# Patient Record
Sex: FEMALE | Race: WHITE
[De-identification: names, ages, dates, MRNs, and addresses within clinical notes are randomized per-mention and may not be internally consistent; named-entity substitution may affect disease eponyms.]

---

## 2017-11-08 ENCOUNTER — HOSPITAL ENCOUNTER (INPATIENT)
Dept: HOSPITAL 26 - MED | Age: 37
LOS: 2 days | Discharge: HOME | DRG: 445 | End: 2017-11-10
Payer: SELF-PAY

## 2017-11-08 VITALS — DIASTOLIC BLOOD PRESSURE: 74 MMHG | SYSTOLIC BLOOD PRESSURE: 140 MMHG

## 2017-11-08 VITALS — DIASTOLIC BLOOD PRESSURE: 78 MMHG | SYSTOLIC BLOOD PRESSURE: 115 MMHG

## 2017-11-08 VITALS — SYSTOLIC BLOOD PRESSURE: 121 MMHG | DIASTOLIC BLOOD PRESSURE: 85 MMHG

## 2017-11-08 VITALS — DIASTOLIC BLOOD PRESSURE: 75 MMHG | SYSTOLIC BLOOD PRESSURE: 117 MMHG

## 2017-11-08 VITALS — WEIGHT: 180 LBS | BODY MASS INDEX: 28.93 KG/M2 | HEIGHT: 66 IN

## 2017-11-08 VITALS — DIASTOLIC BLOOD PRESSURE: 77 MMHG | SYSTOLIC BLOOD PRESSURE: 128 MMHG

## 2017-11-08 VITALS — SYSTOLIC BLOOD PRESSURE: 118 MMHG | DIASTOLIC BLOOD PRESSURE: 58 MMHG

## 2017-11-08 DIAGNOSIS — E66.3: ICD-10-CM

## 2017-11-08 DIAGNOSIS — E87.6: ICD-10-CM

## 2017-11-08 DIAGNOSIS — K29.70: ICD-10-CM

## 2017-11-08 DIAGNOSIS — K75.81: ICD-10-CM

## 2017-11-08 DIAGNOSIS — E44.1: ICD-10-CM

## 2017-11-08 DIAGNOSIS — E83.51: ICD-10-CM

## 2017-11-08 DIAGNOSIS — E02: ICD-10-CM

## 2017-11-08 DIAGNOSIS — K80.70: Primary | ICD-10-CM

## 2017-11-08 DIAGNOSIS — R31.9: ICD-10-CM

## 2017-11-08 LAB
ALBUMIN FLD-MCNC: 3.3 G/DL (ref 3.4–5)
AMYLASE SERPL-CCNC: 56 U/L (ref 25–115)
ANION GAP SERPL CALCULATED.3IONS-SCNC: 8.6 MMOL/L (ref 8–16)
APPEARANCE UR: CLEAR
AST SERPL-CCNC: 70 U/L (ref 15–37)
BARBITURATES UR QL SCN: (no result) NG/ML
BENZODIAZ UR QL SCN: (no result) NG/ML
BILIRUB SERPL-MCNC: 0.2 MG/DL (ref 0–1)
BILIRUB UR QL STRIP: NEGATIVE
BUN SERPL-MCNC: 13 MG/DL (ref 7–18)
BZE UR QL SCN: (no result) NG/ML
CANNABINOIDS UR QL SCN: (no result) NG/ML
CHLORIDE SERPL-SCNC: 104 MMOL/L (ref 98–107)
CHOLEST/HDLC SERPL: 2.5 {RATIO} (ref 1–4.5)
CO2 SERPL-SCNC: 30.8 MMOL/L (ref 21–32)
COLOR UR: YELLOW
CREAT SERPL-MCNC: 0.8 MG/DL (ref 0.6–1.3)
EOSINOPHIL NFR BLD MANUAL: 1 % (ref 0–4)
ERYTHROCYTE [DISTWIDTH] IN BLOOD BY AUTOMATED COUNT: 11.9 % (ref 11.6–13.7)
GFR SERPL CREATININE-BSD FRML MDRD: 104 ML/MIN (ref 90–?)
GLUCOSE SERPL-MCNC: 100 MG/DL (ref 74–106)
GLUCOSE UR STRIP-MCNC: NEGATIVE MG/DL
HCT VFR BLD AUTO: 41.7 % (ref 36–48)
HDLC SERPL-MCNC: 65 MG/DL (ref 40–60)
HGB BLD-MCNC: 13.4 G/DL (ref 12–16)
HGB UR QL STRIP: (no result)
LDLC SERPL CALC-MCNC: 89 MG/DL (ref 60–100)
LEUKOCYTE ESTERASE UR QL STRIP: NEGATIVE
LIPASE SERPL-CCNC: 146 U/L (ref 73–393)
LYMPHOCYTES NFR BLD MANUAL: 32 % (ref 20–46)
MAGNESIUM SERPL-MCNC: 2.1 MG/DL (ref 1.8–2.4)
MCH RBC QN AUTO: 28 PG (ref 27–31)
MCHC RBC AUTO-ENTMCNC: 32 G/DL (ref 33–37)
MCV RBC AUTO: 86 FL (ref 80–94)
MONOCYTES NFR BLD MANUAL: 12 % (ref 5–12)
NITRITE UR QL STRIP: NEGATIVE
OPIATES UR QL SCN: (no result) NG/ML
PCP UR QL SCN: (no result) NG/ML
PH UR STRIP: 6 [PH] (ref 5–9)
PHOSPHATE SERPL-MCNC: 4.1 MG/DL (ref 2.5–4.9)
PLATELET # BLD AUTO: 322 K/UL (ref 140–450)
POTASSIUM SERPL-SCNC: 3.4 MMOL/L (ref 3.5–5.1)
PROTHROMBIN TIME: 10.8 SECS (ref 10.8–13.4)
RBC # BLD AUTO: 4.85 MIL/UL (ref 4.2–5.4)
RBC #/AREA URNS HPF: (no result) /HPF (ref 0–5)
SODIUM SERPL-SCNC: 140 MMOL/L (ref 136–145)
T4 FREE SERPL-MCNC: 1.11 NG/DL (ref 0.76–1.46)
TRIGL SERPL-MCNC: 44 MG/DL (ref 30–150)
TSH SERPL DL<=0.05 MIU/L-ACNC: 3.86 UIU/ML (ref 0.34–3.74)
WBC # BLD AUTO: 7.5 K/UL (ref 4.8–10.8)
WBC,URINE: (no result) /HPF (ref 0–5)

## 2017-11-08 PROCEDURE — A9510 TC99M DISOFENIN: HCPCS

## 2017-11-08 PROCEDURE — C9113 INJ PANTOPRAZOLE SODIUM, VIA: HCPCS

## 2017-11-08 RX ADMIN — HYDROMORPHONE HYDROCHLORIDE PRN MG: 1 INJECTION, SOLUTION INTRAMUSCULAR; INTRAVENOUS; SUBCUTANEOUS at 11:33

## 2017-11-08 RX ADMIN — SODIUM CHLORIDE SCH MLS/HR: 9 INJECTION, SOLUTION INTRAVENOUS at 20:45

## 2017-11-08 RX ADMIN — SODIUM CHLORIDE SCH MLS/HR: 9 INJECTION, SOLUTION INTRAVENOUS at 05:21

## 2017-11-08 RX ADMIN — HYDROMORPHONE HYDROCHLORIDE PRN MG: 1 INJECTION, SOLUTION INTRAMUSCULAR; INTRAVENOUS; SUBCUTANEOUS at 17:27

## 2017-11-08 RX ADMIN — DOCUSATE SODIUM SCH MG: 100 CAPSULE, LIQUID FILLED ORAL at 20:47

## 2017-11-08 RX ADMIN — DOCUSATE SODIUM SCH MG: 100 CAPSULE, LIQUID FILLED ORAL at 09:00

## 2017-11-08 RX ADMIN — SODIUM CHLORIDE SCH MLS/HR: 9 INJECTION, SOLUTION INTRAVENOUS at 22:01

## 2017-11-08 NOTE — NUR
PATIENT ADMITTED TO THE UNIT FROM THE ER. PATIENT IS AWAKE, ALERT, AND ORIENTED. AMBULATORY. 
NO SIGNS AND SYMPTOMS OF DISTRESS NOTED. NO COMPLAINTS OF PAIN AT THIS TIME. SKIN IS INTACT. 
IV SITE IS NOTED ON LEFT AC, SALINE LOCKED. BED IN LOWEST POSITION, SIDE RAILS UP AND CALL 
LIGHT WITHIN REACH, WILL CONTINUE TO MONITOR.

## 2017-11-08 NOTE — NUR
DR BEAUCHAMP WAS NOTIFY HIDE SCAN RESULT  AND HE SAID  FOR TOMORROW  REGULAR DIET AND  SHE 
CAN GO HOME AND HE CAN SEE PT ON HIS OFFICE AFTER DISCHARGE

## 2017-11-08 NOTE — NUR
RECEIVED PT REPORT FROM NIGHT SHIFT NURSE AT BEDSIDE. PT IS AOX4. PT SHOWED NO S/S OF ACUTE 
DISTRESS. NO C/O PAIN AT THIS TIME. SKIN INTACT. IV NOTED ON THE LEFT ARM, PATENT, AND 
INFUSING WELL. CALL LIGHT WITHIN REACH.  BED LOWERED AND CALL LIGHT WITHIN REACH. WILL 
CONTINUE TO MONITOR.

## 2017-11-08 NOTE — NUR
RECEIVED PT REPORT FROM NIGHT SHIFT NURSE AT BEDSIDE. PT IS AOX4. PT SHOWED NO S/S OF ACUTE 
DISTRESS. NO C/O PAIN AT THIS TIME. SKIN INTACT. IV NOTED ON THE LEFT ARM, PATENT, AND 
INFUSING WELL. CALL LIGHT WITHIN REACH.  BED LOWERED AND CALL LIGHT WITHIN REACH. WILL 
CONTINUE TO MONITOR.

-------------------------------------------------------------------------------

Addendum: 11/08/17 at 1255 by Jacek Ramos RN

-------------------------------------------------------------------------------

PLEASE DISCARD THIS NOTE, WRONG TIME.

## 2017-11-08 NOTE — NUR
STAN CALLED FROM [facility] STATED THAT NO DELAYED IMAGE NEEDED. RESULT HASN'T BEEN READ 
BY THE RADIOLOGIST YET. INFORMED DR. DIAZ. DR ORDERED CLEAR LIQUID DIET, THEN NPO AFTER 
MIDNIGHT.

## 2017-11-08 NOTE — NUR
PATIENT HAS BEEN SCREENED AND CATEGORIZED AS MODERATE NUTRITION RISK. PATIENT WILL BE SEEN 
WITHIN 3-5 DAYS OF ADMISSION.



11/10/17-11/12/17



SHELBIE SEBASTIAN RD

## 2017-11-08 NOTE — NUR
RECEIVED PT FROM DAY SHIFT NURSE PT IS AAOX4 AMBULATORY IV ON LEFT AC INFUSING WELL ON 
TELEMETRY SR , RELATIVES AT BED SIDE INITIAL ASSESSMENT DONE

## 2017-11-08 NOTE — NUR
DR. BEAUCHAMP SEEN THE PT.  STATED HE WILL WAIT FOR THE HIDA SCAN RESULT, THEN DECIDE 
ABOUT THE SURGERY.

## 2017-11-08 NOTE — NUR
Patient will be admitted to care of DR CROUCH. Admited to TELE.  Will go to 
room 112B. Belongings list completed.  Report to LEXIE KING.

## 2017-11-08 NOTE — NUR
PATIENT PRESENTS TO ED WITH C/O EPIGASTRIC PAIN AND NAUSEA. HX OF GALLSTONES. 

PT SKIN IS PINK/WARM/DRY; AAOX4 WITH EVEN AND STEADY GAIT; LUNGS CLEAR BL; HR 
EVEN AND REGULAR; PT DENIES ANY FEVER, CP, SOB, OR COUGH AT THIS TIME; PATIENT 
STATES PAIN OF 10/10 AT THIS TIME; VSS; PATIENT POSITIONED FOR COMFORT; HOB 
ELEVATED; BEDRAILS UP X2; BED DOWN. ER MD MADE AWARE OF PT STATUS.

## 2017-11-08 NOTE — NUR
PT BACK TO THE UNIT. VITAL SIGNS TAKEN. PT IN STABLE CONDITION. RADIOLOGIST WANTS TO KEEP 
HER NPO FOR ANOTHER HOUR IN CASE DELAYED IMAGE NEEDED.

## 2017-11-09 VITALS — SYSTOLIC BLOOD PRESSURE: 135 MMHG | DIASTOLIC BLOOD PRESSURE: 77 MMHG

## 2017-11-09 VITALS — SYSTOLIC BLOOD PRESSURE: 102 MMHG | DIASTOLIC BLOOD PRESSURE: 61 MMHG

## 2017-11-09 VITALS — SYSTOLIC BLOOD PRESSURE: 133 MMHG | DIASTOLIC BLOOD PRESSURE: 78 MMHG

## 2017-11-09 VITALS — DIASTOLIC BLOOD PRESSURE: 81 MMHG | SYSTOLIC BLOOD PRESSURE: 121 MMHG

## 2017-11-09 VITALS — DIASTOLIC BLOOD PRESSURE: 75 MMHG | SYSTOLIC BLOOD PRESSURE: 116 MMHG

## 2017-11-09 LAB
ANION GAP SERPL CALCULATED.3IONS-SCNC: 8.4 MMOL/L (ref 8–16)
BASOPHILS # BLD AUTO: 0.3 K/UL (ref 0–0.22)
BASOPHILS NFR BLD AUTO: 4.7 % (ref 0–2)
BUN SERPL-MCNC: 9 MG/DL (ref 7–18)
CHLORIDE SERPL-SCNC: 106 MMOL/L (ref 98–107)
CO2 SERPL-SCNC: 28.6 MMOL/L (ref 21–32)
CREAT SERPL-MCNC: 0.8 MG/DL (ref 0.6–1.3)
EOSINOPHIL # BLD AUTO: 0.2 K/UL (ref 0–0.4)
EOSINOPHIL NFR BLD AUTO: 3.6 % (ref 0–4)
ERYTHROCYTE [DISTWIDTH] IN BLOOD BY AUTOMATED COUNT: 12.2 % (ref 11.6–13.7)
GFR SERPL CREATININE-BSD FRML MDRD: 104 ML/MIN (ref 90–?)
GLUCOSE SERPL-MCNC: 86 MG/DL (ref 74–106)
HCT VFR BLD AUTO: 38.7 % (ref 36–48)
HGB BLD-MCNC: 13 G/DL (ref 12–16)
LYMPHOCYTES # BLD AUTO: 2.4 K/UL (ref 2.5–16.5)
LYMPHOCYTES NFR BLD AUTO: 39.8 % (ref 20.5–51.1)
MAGNESIUM SERPL-MCNC: 2.1 MG/DL (ref 1.8–2.4)
MCH RBC QN AUTO: 29 PG (ref 27–31)
MCHC RBC AUTO-ENTMCNC: 34 G/DL (ref 33–37)
MCV RBC AUTO: 86 FL (ref 80–94)
MONOCYTES # BLD AUTO: 0.7 K/UL (ref 0.8–1)
MONOCYTES NFR BLD AUTO: 12.4 % (ref 1.7–9.3)
NEUTROPHILS # BLD AUTO: 2.3 K/UL (ref 1.8–7.7)
NEUTROPHILS NFR BLD AUTO: 39.5 % (ref 42.2–75.2)
PHOSPHATE SERPL-MCNC: 3.2 MG/DL (ref 2.5–4.9)
PLATELET # BLD AUTO: 307 K/UL (ref 140–450)
POTASSIUM SERPL-SCNC: 4 MMOL/L (ref 3.5–5.1)
RBC # BLD AUTO: 4.52 MIL/UL (ref 4.2–5.4)
SODIUM SERPL-SCNC: 139 MMOL/L (ref 136–145)
WBC # BLD AUTO: 5.9 K/UL (ref 4.8–10.8)

## 2017-11-09 RX ADMIN — Medication SCH EACH: at 08:22

## 2017-11-09 RX ADMIN — HYDROMORPHONE HYDROCHLORIDE PRN MG: 1 INJECTION, SOLUTION INTRAMUSCULAR; INTRAVENOUS; SUBCUTANEOUS at 00:39

## 2017-11-09 RX ADMIN — SODIUM CHLORIDE PRN MG: 9 INJECTION, SOLUTION INTRAVENOUS at 14:26

## 2017-11-09 RX ADMIN — PANTOPRAZOLE SODIUM SCH MG: 40 TABLET, DELAYED RELEASE ORAL at 21:55

## 2017-11-09 RX ADMIN — HYDROMORPHONE HYDROCHLORIDE PRN MG: 1 INJECTION, SOLUTION INTRAMUSCULAR; INTRAVENOUS; SUBCUTANEOUS at 06:22

## 2017-11-09 RX ADMIN — SODIUM CHLORIDE SCH MLS/HR: 9 INJECTION, SOLUTION INTRAVENOUS at 21:53

## 2017-11-09 RX ADMIN — HYDROMORPHONE HYDROCHLORIDE PRN MG: 1 INJECTION, SOLUTION INTRAMUSCULAR; INTRAVENOUS; SUBCUTANEOUS at 14:37

## 2017-11-09 RX ADMIN — DOCUSATE SODIUM SCH MG: 100 CAPSULE, LIQUID FILLED ORAL at 21:55

## 2017-11-09 RX ADMIN — HYDROMORPHONE HYDROCHLORIDE PRN MG: 1 INJECTION, SOLUTION INTRAMUSCULAR; INTRAVENOUS; SUBCUTANEOUS at 21:58

## 2017-11-09 RX ADMIN — SODIUM CHLORIDE SCH MLS/HR: 9 INJECTION, SOLUTION INTRAVENOUS at 05:11

## 2017-11-09 RX ADMIN — SODIUM CHLORIDE SCH MLS/HR: 9 INJECTION, SOLUTION INTRAVENOUS at 12:23

## 2017-11-09 RX ADMIN — DOCUSATE SODIUM SCH MG: 100 CAPSULE, LIQUID FILLED ORAL at 08:22

## 2017-11-09 RX ADMIN — SODIUM CHLORIDE PRN MG: 9 INJECTION, SOLUTION INTRAVENOUS at 08:22

## 2017-11-09 RX ADMIN — SODIUM CHLORIDE SCH MLS/HR: 9 INJECTION, SOLUTION INTRAVENOUS at 07:26

## 2017-11-09 NOTE — NUR
PATIENT VOMITED 200 ML. NO SIGNS AND SYMPTOMS OF DISTRESS NOTED AT THIS TIME. REQUESTED TO 
TAKE A SHOWER. MOTHER WILL ASSIST HER IN THE SHOWER.

## 2017-11-09 NOTE — NUR
PATIENT TRIED TO EAT SOME JELLO, SAID HER STOMACH STILL FEELS UPSET, AND VOMITED 60ML. NO 
SIGNS AND SYMPTOMS OF DISTRESS NOTED AT THIS TIME.

## 2017-11-09 NOTE — NUR
PT C/O ABD PAIN 9/10, PAIN MEDICATION GIVEN, DUE MEDICATION ALSO GIVEN, PT TOLERATED WELL, 
NO DISTRESS NOTED, CALL LIGHT WITHIN REACH WILL CONTINUE TO MONITOR.

## 2017-11-09 NOTE — NUR
RECEIVED REPORT FROM NIGHT SHIFT RN. PATIENT IS AAOX4, NO SIGNS AND SYMPTOMS OF DISTRESS 
NOTED AT THIS TIME. HAS IV IN LEFT FA 20G INFUSING NS  ML/HR. SITE IS CLEAN, DRY, 
PATENT AND INTACT. DISCUSSED PLAN OF CARE WITH PATIENT AND SHE VERBALIZED UNDERSTANDING. BED 
IN LOWEST POSITION, SIDERAILS UP X2, CALL LIGHT PLACED WITHIN REACH, WILL CONTINUE TO 
MONITOR.

## 2017-11-09 NOTE — NUR
RECEIVED BEDSIDE REPORT FROM DAY SHIFT NURSE, FRANCISCO JAVIER RN, PT STABLE, NO DISTRESS NOTED, 
AAOX4, SKIN INTACT, IV TO THE L FA 20 RUNNING NS @120 ML/HR INFUSING WELL, CURRENTLY 
REPORTED HAVING NO PAIN AND NAUSEA. FAMILY BY BEDSIDE, ALL SAFETY PRECAUTION MET, INITIAL 
ASSESSMENT DONE, PT RESTING, CALL LIGHT WITHIN REACH, WILL CONTINUE TO MONITOR.

## 2017-11-10 VITALS — DIASTOLIC BLOOD PRESSURE: 64 MMHG | SYSTOLIC BLOOD PRESSURE: 107 MMHG

## 2017-11-10 VITALS — SYSTOLIC BLOOD PRESSURE: 107 MMHG | DIASTOLIC BLOOD PRESSURE: 73 MMHG

## 2017-11-10 LAB
ANION GAP SERPL CALCULATED.3IONS-SCNC: 9 MMOL/L (ref 8–16)
BUN SERPL-MCNC: 8 MG/DL (ref 7–18)
CHLORIDE SERPL-SCNC: 105 MMOL/L (ref 98–107)
CO2 SERPL-SCNC: 27.9 MMOL/L (ref 21–32)
CREAT SERPL-MCNC: 0.8 MG/DL (ref 0.6–1.3)
EOSINOPHIL NFR BLD MANUAL: 6 % (ref 0–4)
ERYTHROCYTE [DISTWIDTH] IN BLOOD BY AUTOMATED COUNT: 12 % (ref 11.6–13.7)
GFR SERPL CREATININE-BSD FRML MDRD: 104 ML/MIN (ref 90–?)
GLUCOSE SERPL-MCNC: 97 MG/DL (ref 74–106)
HCT VFR BLD AUTO: 38.9 % (ref 36–48)
HGB BLD-MCNC: 13 G/DL (ref 12–16)
LYMPHOCYTES NFR BLD MANUAL: 40 % (ref 20–46)
MAGNESIUM SERPL-MCNC: 2.1 MG/DL (ref 1.8–2.4)
MCH RBC QN AUTO: 29 PG (ref 27–31)
MCHC RBC AUTO-ENTMCNC: 33 G/DL (ref 33–37)
MCV RBC AUTO: 86 FL (ref 80–94)
MONOCYTES NFR BLD MANUAL: 15 % (ref 5–12)
PHOSPHATE SERPL-MCNC: 3.2 MG/DL (ref 2.5–4.9)
PLATELET # BLD AUTO: 294 K/UL (ref 140–450)
POTASSIUM SERPL-SCNC: 3.9 MMOL/L (ref 3.5–5.1)
RBC # BLD AUTO: 4.54 MIL/UL (ref 4.2–5.4)
SODIUM SERPL-SCNC: 138 MMOL/L (ref 136–145)
WBC # BLD AUTO: 6.1 K/UL (ref 4.8–10.8)

## 2017-11-10 RX ADMIN — PANTOPRAZOLE SODIUM SCH MG: 40 TABLET, DELAYED RELEASE ORAL at 08:33

## 2017-11-10 RX ADMIN — HYDROCODONE BITARTRATE AND ACETAMINOPHEN PRN TAB: 7.5; 325 TABLET ORAL at 10:28

## 2017-11-10 RX ADMIN — SODIUM CHLORIDE PRN MG: 9 INJECTION, SOLUTION INTRAVENOUS at 08:34

## 2017-11-10 RX ADMIN — HYDROCODONE BITARTRATE AND ACETAMINOPHEN PRN TAB: 7.5; 325 TABLET ORAL at 05:26

## 2017-11-10 RX ADMIN — SODIUM CHLORIDE SCH MLS/HR: 9 INJECTION, SOLUTION INTRAVENOUS at 12:42

## 2017-11-10 RX ADMIN — Medication SCH EACH: at 08:33

## 2017-11-10 RX ADMIN — SODIUM CHLORIDE SCH MLS/HR: 9 INJECTION, SOLUTION INTRAVENOUS at 05:23

## 2017-11-10 RX ADMIN — DOCUSATE SODIUM SCH MG: 100 CAPSULE, LIQUID FILLED ORAL at 08:33

## 2017-11-10 NOTE — NUR
PT REFUSED THE ENEMA THAT WAS ORDERED. SHE STATED SHE WILL TRY TO AMBULATE AND TRY HAVING A 
BM BEFORE HAVING THE ENEMA.

## 2017-11-10 NOTE — NUR
PT'S RIDE IS HERE. WHEELED PT OUT IN A WHEELCHAIR. PT PERSONAL BELONGINGS WITH HER. PT IS IN 
STABLE CONDITION.

## 2017-11-10 NOTE — NUR
GAVE DISCHARGE INSTRUCTIONS TO PT. ANSWERED ALL QUESTIONS. PT VERBALIZED UNDERSTANDING AND 
SIGNED APPROPRIATE FORMS. REMOVED IV, CANNULA INTACT. NO SIGNS OF BLEEDING. REMOVED ID BAND. 
PT WILL NOW GET DRESSED AND WILL LET ME KNOW WHEN HER RIDE GETS HERE. WE WILL HAVE A 
WHEELCHAIR AVAILABLE.

## 2017-11-10 NOTE — NUR
DUE MEDICATION GIVEN, PT C/O PAIN OF 4/10, PAIN MEDICATION GIVEN, NO DISTRESS NOTED, PT 
RESTING, CALL LIGHT WITHIN REACH, WILL CONTINUE TO MONITOR,

## 2017-11-10 NOTE — NUR
EMERSON AKHTAR. NOTIFIED DR HINOJOSA RE. CONSTIPATION. HE ALSO PUT IN A GI COCKTAIL FOR ABD 
PAIN. WILL FIND OUT PLAN.

## 2017-11-10 NOTE — NUR
PT'S NAUSEA IS BETTER. STILL HAD SOME ABD PAIN. ADMINISTERED NORCO. PT TOLERATED WELL. WILL 
CONTINUE TO MONITOR PT.

## 2017-11-10 NOTE — NUR
PT RESTING, NO DISTRESS NOTED, V/S TAKEN, PT STATED HAVING NO PAIN AT THIS TIME, CALL LIGHT 
WITHIN REACH, WILL CONTINUE TO MONITOR.

## 2017-11-10 NOTE — NUR
PT'S RIDE IS HERE. WHEELED PT OUT IN A WHEELCHAIR. PT PERSONAL BELONGINGS WITH HER. PT IS IN 
STABLE CONDITION.

-------------------------------------------------------------------------------

Addendum: 11/10/17 at 1455 by Jennifer Florence RN

-------------------------------------------------------------------------------

TIME AMENDED: 1443

## 2017-11-10 NOTE — NUR
ADMINISTERED MORNING MEDS. PT C/O OF NAUSEA, ADMINISTERED ZOFRAN. PT TOLERATED WELL. PT 
STILL C/O OF ABD PAIN. EXPLAINED TO HER THAT THE GI COCKTAIL SHOULD HELP WITH THE ABD PAIN 
AND DISCOMFORT. EXPLAINED TO HER THAT WE WILL REASSESS IN ABOUT 30 MIN BEFORE ADMINISTERING 
PAIN MED.  PT VERBALIZED UNDERSTANDING. WILL CONTINUE TO MONITOR PT.

## 2017-11-10 NOTE — NUR
RECEIVED REPORT FROM THE NIGHT SHIFT NURSE AT BEDSIDE FOR CONTINUITY OF CARE. PT IS AWAKE 
AND ORIENTED. INTRODUCED MYSELF AND UPDATE THE BOARD. PT IS C/O SOME NAUSEA. WILL MEDICATE. 
WANTED TO AMBULATE TO THE BATHROOM. ASSISTED. SKIN IS INTACT. IV ON L FA 20G TKO. LBM ON 
11/6/17. WILL SPEAK WITH DR. RE CONSTIPATION. LABS ARE UNREMARKABLE. WILL CONTINUE TO 
MONITOR PT.

## 2019-01-22 ENCOUNTER — HOSPITAL ENCOUNTER (EMERGENCY)
Dept: HOSPITAL 26 - MED | Age: 39
Discharge: HOME | End: 2019-01-22
Payer: SELF-PAY

## 2019-01-22 VITALS — DIASTOLIC BLOOD PRESSURE: 66 MMHG | SYSTOLIC BLOOD PRESSURE: 120 MMHG

## 2019-01-22 VITALS — SYSTOLIC BLOOD PRESSURE: 126 MMHG | DIASTOLIC BLOOD PRESSURE: 79 MMHG

## 2019-01-22 VITALS — WEIGHT: 152 LBS | BODY MASS INDEX: 24.43 KG/M2 | HEIGHT: 66 IN
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
## 2019-01-22 DIAGNOSIS — S00.93XA: ICD-10-CM
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
## 2019-01-22 DIAGNOSIS — S39.92XA: Primary | ICD-10-CM

## 2019-01-22 DIAGNOSIS — Z79.899: ICD-10-CM

## 2019-01-22 DIAGNOSIS — B34.9: ICD-10-CM

## 2019-01-22 NOTE — NUR
bib self with c/o 8/10 intermittent non radiating posterior neck and right 
lower back pain x approx 3 wks progressively getting worse after  fall. 
Patient sts taking Motrin with no relief. Patient with steady gait. Full ROM to 
neck. 

-------------------------------------------------------------------------------

Addendum: 01/22/19 at 1816 by MED1

-------------------------------------------------------------------------------

cough

## 2019-01-22 NOTE — NUR
Patient discharged with v/s stable. Written and verbal after care instructions 
given and explained. 

Patient alert, oriented and verbalized understanding of instructions. 
Ambulatory with steady gait. All questions addressed prior to discharge. ID 
band removed. Patient advised to follow up with PMD. Rx of NORCO & TESSALON 
given. Patient educated on indication of medication including possible reaction 
and side effects. Opportunity to ask questions provided and answered.

## 2019-08-20 ENCOUNTER — HOSPITAL ENCOUNTER (EMERGENCY)
Dept: HOSPITAL 26 - MED | Age: 39
Discharge: HOME | End: 2019-08-20
Payer: SELF-PAY

## 2019-08-20 VITALS — BODY MASS INDEX: 30.61 KG/M2 | HEIGHT: 67 IN | WEIGHT: 195 LBS

## 2019-08-20 VITALS — DIASTOLIC BLOOD PRESSURE: 78 MMHG | SYSTOLIC BLOOD PRESSURE: 127 MMHG

## 2019-08-20 VITALS — SYSTOLIC BLOOD PRESSURE: 143 MMHG | DIASTOLIC BLOOD PRESSURE: 92 MMHG

## 2019-08-20 DIAGNOSIS — K80.20: Primary | ICD-10-CM

## 2019-08-20 LAB
ALBUMIN FLD-MCNC: 3.4 G/DL (ref 3.4–5)
ANION GAP SERPL CALCULATED.3IONS-SCNC: 9 MMOL/L (ref 8–16)
AST SERPL-CCNC: 35 U/L (ref 15–37)
BASOPHILS # BLD AUTO: 0 K/UL (ref 0–0.22)
BASOPHILS NFR BLD AUTO: 0.5 % (ref 0–2)
BILIRUB SERPL-MCNC: 0.5 MG/DL (ref 0–1)
BUN SERPL-MCNC: 11 MG/DL (ref 7–18)
CHLORIDE SERPL-SCNC: 103 MMOL/L (ref 98–107)
CO2 SERPL-SCNC: 30.3 MMOL/L (ref 21–32)
CREAT SERPL-MCNC: 0.8 MG/DL (ref 0.6–1.3)
EOSINOPHIL # BLD AUTO: 0.1 K/UL (ref 0–0.4)
EOSINOPHIL NFR BLD AUTO: 1.4 % (ref 0–4)
ERYTHROCYTE [DISTWIDTH] IN BLOOD BY AUTOMATED COUNT: 13.3 % (ref 11.6–13.7)
GFR SERPL CREATININE-BSD FRML MDRD: 103 ML/MIN (ref 90–?)
GLUCOSE SERPL-MCNC: 105 MG/DL (ref 74–106)
HCT VFR BLD AUTO: 40.6 % (ref 36–48)
HGB BLD-MCNC: 13.4 G/DL (ref 12–16)
LIPASE SERPL-CCNC: 136 U/L (ref 73–393)
LYMPHOCYTES # BLD AUTO: 1.7 K/UL (ref 2.5–16.5)
LYMPHOCYTES NFR BLD AUTO: 30.3 % (ref 20.5–51.1)
MCH RBC QN AUTO: 28 PG (ref 27–31)
MCHC RBC AUTO-ENTMCNC: 33 G/DL (ref 33–37)
MCV RBC AUTO: 84.3 FL (ref 80–94)
MONOCYTES # BLD AUTO: 0.6 K/UL (ref 0.8–1)
MONOCYTES NFR BLD AUTO: 11.2 % (ref 1.7–9.3)
NEUTROPHILS # BLD AUTO: 3.1 K/UL (ref 1.8–7.7)
NEUTROPHILS NFR BLD AUTO: 56.6 % (ref 42.2–75.2)
PLATELET # BLD AUTO: 311 K/UL (ref 140–450)
POTASSIUM SERPL-SCNC: 4.3 MMOL/L (ref 3.5–5.1)
RBC # BLD AUTO: 4.82 MIL/UL (ref 4.2–5.4)
SODIUM SERPL-SCNC: 138 MMOL/L (ref 136–145)
WBC # BLD AUTO: 5.5 K/UL (ref 4.8–10.8)

## 2019-08-20 PROCEDURE — 99283 EMERGENCY DEPT VISIT LOW MDM: CPT

## 2019-08-20 PROCEDURE — 83690 ASSAY OF LIPASE: CPT

## 2019-08-20 PROCEDURE — 36415 COLL VENOUS BLD VENIPUNCTURE: CPT

## 2019-08-20 PROCEDURE — 81025 URINE PREGNANCY TEST: CPT

## 2019-08-20 PROCEDURE — 96372 THER/PROPH/DIAG INJ SC/IM: CPT

## 2019-08-20 PROCEDURE — 85025 COMPLETE CBC W/AUTO DIFF WBC: CPT

## 2019-08-20 PROCEDURE — 81002 URINALYSIS NONAUTO W/O SCOPE: CPT

## 2019-08-20 PROCEDURE — 80053 COMPREHEN METABOLIC PANEL: CPT

## 2019-08-20 NOTE — NUR
PT C/O SUDDEN ONSET OF EPIGASTRIC, RUQ ABD PAIN RADIATES TO BACK AND RIGHT 
SHOULDER X 50 MINS PTA. NAUSEA AND VOMITING FOR ONE TIME SINCE THEN. NO 
DIARRHEA. NO URINARY COMPLAINTS. REPORTS HX OF GALLBLADDER STONES W/O REMOVAL. 
HAM'S SIGN NEGATIVE.  PATIENT STATES PAIN OF 10/10 AT THIS TIME; VSS; 
PATIENT POSITIONED FOR COMFORT; HOB ELEVATED; BEDRAILS UP X1; BED DOWN. ER MD 
MADE AWARE OF PT STATUS.

## 2019-08-20 NOTE — NUR
Patient discharged with v/s stable. Written and verbal after care instructions 
given and explained. 

Patient alert, oriented and verbalized understanding of instructions. 
Ambulatory with steady gait. All questions addressed prior to discharge. ID 
band removed. Patient advised to follow up with PMD. Rx of norco/ zofran given. 
Patient educated on indication of medication including possible reaction and 
side effects. Opportunity to ask questions provided and answered.

## 2019-12-30 ENCOUNTER — HOSPITAL ENCOUNTER (INPATIENT)
Dept: HOSPITAL 26 - MED | Age: 39
LOS: 1 days | Discharge: HOME | DRG: 446 | End: 2019-12-31
Attending: GENERAL PRACTICE | Admitting: GENERAL PRACTICE
Payer: SELF-PAY

## 2019-12-30 VITALS — SYSTOLIC BLOOD PRESSURE: 101 MMHG | DIASTOLIC BLOOD PRESSURE: 62 MMHG

## 2019-12-30 VITALS — WEIGHT: 190 LBS | BODY MASS INDEX: 29.82 KG/M2 | HEIGHT: 67 IN

## 2019-12-30 VITALS — SYSTOLIC BLOOD PRESSURE: 120 MMHG | DIASTOLIC BLOOD PRESSURE: 85 MMHG

## 2019-12-30 VITALS — SYSTOLIC BLOOD PRESSURE: 138 MMHG | DIASTOLIC BLOOD PRESSURE: 71 MMHG

## 2019-12-30 DIAGNOSIS — K80.20: Primary | ICD-10-CM

## 2019-12-30 DIAGNOSIS — R74.0: ICD-10-CM

## 2019-12-30 DIAGNOSIS — E66.3: ICD-10-CM

## 2019-12-30 DIAGNOSIS — E02: ICD-10-CM

## 2019-12-30 LAB
ALBUMIN FLD-MCNC: 3.4 G/DL (ref 3.4–5)
ANION GAP SERPL CALCULATED.3IONS-SCNC: 12.8 MMOL/L (ref 8–16)
APPEARANCE UR: CLEAR
AST SERPL-CCNC: 324 U/L (ref 15–37)
BARBITURATES UR QL SCN: (no result) NG/ML
BASOPHILS # BLD AUTO: 0 K/UL (ref 0–0.22)
BASOPHILS NFR BLD AUTO: 0.4 % (ref 0–2)
BENZODIAZ UR QL SCN: (no result) NG/ML
BILIRUB SERPL-MCNC: 0.8 MG/DL (ref 0–1)
BILIRUB UR QL STRIP: (no result)
BUN SERPL-MCNC: 9 MG/DL (ref 7–18)
BZE UR QL SCN: (no result) NG/ML
CANNABINOIDS UR QL SCN: (no result) NG/ML
CHLORIDE SERPL-SCNC: 101 MMOL/L (ref 98–107)
CHOLEST/HDLC SERPL: 2.6 {RATIO} (ref 1–4.5)
CO2 SERPL-SCNC: 28 MMOL/L (ref 21–32)
COLOR UR: YELLOW
CREAT SERPL-MCNC: 0.8 MG/DL (ref 0.6–1.3)
EOSINOPHIL # BLD AUTO: 0.1 K/UL (ref 0–0.4)
EOSINOPHIL NFR BLD AUTO: 1.6 % (ref 0–4)
ERYTHROCYTE [DISTWIDTH] IN BLOOD BY AUTOMATED COUNT: 13.6 % (ref 11.6–13.7)
GFR SERPL CREATININE-BSD FRML MDRD: 102 ML/MIN (ref 90–?)
GLUCOSE SERPL-MCNC: 120 MG/DL (ref 74–106)
GLUCOSE UR STRIP-MCNC: NEGATIVE MG/DL
HCT VFR BLD AUTO: 41.3 % (ref 36–48)
HDLC SERPL-MCNC: 65 MG/DL (ref 40–60)
HGB BLD-MCNC: 13.8 G/DL (ref 12–16)
HGB UR QL STRIP: NEGATIVE
LDLC SERPL CALC-MCNC: 96 MG/DL (ref 60–100)
LEUKOCYTE ESTERASE UR QL STRIP: NEGATIVE
LIPASE SERPL-CCNC: 303 U/L (ref 73–393)
LYMPHOCYTES # BLD AUTO: 1.4 K/UL (ref 2.5–16.5)
LYMPHOCYTES NFR BLD AUTO: 22.8 % (ref 20.5–51.1)
MAGNESIUM SERPL-MCNC: 1.8 MG/DL (ref 1.8–2.4)
MCH RBC QN AUTO: 28 PG (ref 27–31)
MCHC RBC AUTO-ENTMCNC: 33 G/DL (ref 33–37)
MCV RBC AUTO: 85.2 FL (ref 80–94)
MONOCYTES # BLD AUTO: 0.9 K/UL (ref 0.8–1)
MONOCYTES NFR BLD AUTO: 15 % (ref 1.7–9.3)
NEUTROPHILS # BLD AUTO: 3.8 K/UL (ref 1.8–7.7)
NEUTROPHILS NFR BLD AUTO: 60.2 % (ref 42.2–75.2)
NITRITE UR QL STRIP: NEGATIVE
OPIATES UR QL SCN: (no result) NG/ML
PCP UR QL SCN: (no result) NG/ML
PH UR STRIP: 8 [PH] (ref 5–9)
PHOSPHATE SERPL-MCNC: 2.7 MG/DL (ref 2.5–4.9)
PLATELET # BLD AUTO: 280 K/UL (ref 140–450)
POTASSIUM SERPL-SCNC: 3.8 MMOL/L (ref 3.5–5.1)
PROTHROMBIN TIME: 10.2 SECS (ref 10.8–13.4)
RBC # BLD AUTO: 4.85 MIL/UL (ref 4.2–5.4)
SODIUM SERPL-SCNC: 138 MMOL/L (ref 136–145)
TRIGL SERPL-MCNC: 49 MG/DL (ref 30–150)
TSH SERPL DL<=0.05 MIU/L-ACNC: 4.27 UIU/ML (ref 0.34–3.74)
WBC # BLD AUTO: 6.3 K/UL (ref 4.8–10.8)

## 2019-12-30 PROCEDURE — A9510 TC99M DISOFENIN: HCPCS

## 2019-12-30 PROCEDURE — C9113 INJ PANTOPRAZOLE SODIUM, VIA: HCPCS

## 2019-12-30 RX ADMIN — KETOROLAC TROMETHAMINE PRN MG: 30 INJECTION, SOLUTION INTRAMUSCULAR; INTRAVENOUS at 20:15

## 2019-12-30 RX ADMIN — DEXTROSE AND SODIUM CHLORIDE SCH MLS/HR: 5; .9 INJECTION, SOLUTION INTRAVENOUS at 18:20

## 2019-12-30 RX ADMIN — SODIUM CHLORIDE PRN MG: 9 INJECTION, SOLUTION INTRAVENOUS at 08:10

## 2019-12-30 RX ADMIN — SODIUM CHLORIDE PRN MG: 9 INJECTION, SOLUTION INTRAVENOUS at 20:15

## 2019-12-30 RX ADMIN — PANTOPRAZOLE SODIUM SCH MG: 40 INJECTION, POWDER, FOR SOLUTION INTRAVENOUS at 20:15

## 2019-12-30 RX ADMIN — DEXTROSE AND SODIUM CHLORIDE SCH MLS/HR: 5; .9 INJECTION, SOLUTION INTRAVENOUS at 08:20

## 2019-12-30 NOTE — NUR
HIDA SCAN WILL BE DONE AROUND 4:30-5:50PM TODAY. INFORMED PATIENT SHE WILL NOT BE HAVING ANY 
OPIOIDS OR ZOFRAN FOR HIDA SCAN. PATIENT VERBALIZED UNDERSTANDING.

## 2019-12-30 NOTE — NUR
PULLED 2MG/1ML OF ATIVAN. WASTED 1MG/0.5ML WITH LEXIE MCDONNELL AND ADMINISTERED 
1MG/0.5ML TO THE PATIENT.

## 2019-12-30 NOTE — NUR
GIVEN MORPHINE FOR CHEST AREA 8/10 PAIN.  EXPLAINED TO PATIENT INDICATION AND SIDE EFFECTS. 
PATIENT VERBALIZED UNDERSTANDING. WILL CONTINUE TO MONITOR

## 2019-12-30 NOTE — NUR
PT CAME BACK FROM HIDA SCAN, IN STABLE CONDITION, NO DISTRESS NOTED, IV TO R AC 20G, PER PT 
IV IS HURTING, WILL PUT NEW IV, PT RESTING, NO DISTRESS NOTED, INITIAL ASSESSMENT DONE, ALL 
SAFETY PRECAUTION MET, CALL LIGHT WITHIN REACH, WILL CONTINUE TO MONITOR.

## 2019-12-30 NOTE — NUR
RECEIVED PATIENT FROM ED NURSE VIA WHEELCHAIR. PATIENT IS AAOX4. RESPIRATIONS EVEN AND 
UNLABORED, ON ROOM AIR. ABDOMEN FLAT AND NONTENDER, ABDOMINAL PAIN/CHEST AREA PAIN OF 6/10. 
SKIN INTACT, WARM AND DRY. IV ON THE LEFT AC GAUGE 20, SALINE LOCK. INTACT AND PATENT. 
PATIENT IS CONTINENT. PATIENT IS AMBULATORY. CALL LIGHT IS WITHIN REACH. BED IN LOW 
POSITION. WILL CONTINUE TO MONITOR

## 2019-12-30 NOTE — NUR
40 Y/O FEMALE BIB  FOR EPISGATRIC PAIN X3 DAYS ALONG WITH NAUSEA AND 
VOMITING. PAIN RADIATES TO BACK. TOO MOTRIN YESTERDAY WITH LITTLE RELIEF. C/O 
OF COUGH. PAIN IS AN 8/10. ERMD MADE AWARE OF STATUS. SIDE RAILSX1. PLACED ON 
MONITOR. 





HX- HTN

NKLA

RX: DENIES

## 2019-12-30 NOTE — NUR
PT C/O ABD PAIN, AND NAUSEA, NAUSEA MEDICATION, PAIN MEDICATION AND DUE MEDICATION 
ADMINISTERED, PER DR ORDER, NO DISTRESS NOTED, CALL LIGHT WITHIN REACH, WILL CONTINUE TO 
MONITOR.

## 2019-12-30 NOTE — NUR
GIVEN NORCO FOR PAIN 6/10 IN THE CHEST AREA AND ZOFRAN FOR N/V. EXPLAINED TO PATIENT 
INDICATIONS AND SIDE EFFECTS. PATIENT VERBALIZED UNDERSTANDING. BED IN LOW POSITION. CALL 
LIGHT IS WITHIN REACH. WILL CONTINUE TO MONITOR

## 2019-12-30 NOTE — NUR
PATIENT HAS BEEN SCREENED AND CATEGORIZED AS MODERATE NUTRITION RISK. PATIENT WILL BE SEEN 
WITHIN 3-5 DAYS OF ADMISSION.



01/01/19 01/03/19



KARINE DELGADO RD

## 2019-12-30 NOTE — NUR
Note:



Basic Screen: 

Yes

High Risk DC Screen 

No

 Name: 

PETE De Santiago Tel: 

[phone]

Relationship: 

SIGNIFICANT OTHER

Pre-Admission Living Arrangements: 

Lives with Other

Prior ADL 

 Independent

Current Home Health Name/Tel: 

N/A

Current DME/02 Name/Tel: 

N/A

Current Hospice Name/Tel: 

N/A

Current Dialysis Name/Tel: 

N/A

Healthcare Decision Maker: 

Patient

Advance Directive 

No - REFUSED

Information Taught: 

 Advance Directive

Person Taught: 

Patient

Teaching Tools: 

Verbal

Factors Affecting Learning: 

None

Participation Level: 

Refused

Evaluation: 

Verbalizes Understanding

Needs Additional Education: 

No

Discipline: 

Case Mgt/Social Svcs

Tentative Discharge Plan/Destination: 

No Needs Identified

Will require assistance post discharge: 

No

 Referred to : 

No

Tentative Discharge Plan Summary: 

Patient is a 39-year-old female admitted for transaminitis. Patient has 

PMHX of gallstones. Patient was admitted from home. SW met with patient at 

bedside to verify demographics. Patient reports no history of mental 

health and no history of substance abuse. Patient's tentative discharge 

plan is to return home. No further needs identified.

 Signature: 

ALLEN Rider

Date: 

Dec 30, 2019

Time: 

11:19

## 2019-12-30 NOTE — NUR
Patient will be admitted to care of DR. MONTAÑO. Admited to Med/Surg.  Will go to 
room 106 B. Belongings list completed.  Report to LEXIE SEGURA.

## 2019-12-30 NOTE — NUR
PATIENT IS SLEEPING AT THIS TIME. NO SIGNS OF DISTRESS NOTED. BOYFRIEND, PETE, AT 
BEDSIDE. WILL CONTINUE TO MONITOR

## 2019-12-31 VITALS — SYSTOLIC BLOOD PRESSURE: 114 MMHG | DIASTOLIC BLOOD PRESSURE: 75 MMHG

## 2019-12-31 VITALS — SYSTOLIC BLOOD PRESSURE: 117 MMHG | DIASTOLIC BLOOD PRESSURE: 77 MMHG

## 2019-12-31 LAB
ALBUMIN FLD-MCNC: 2.8 G/DL (ref 3.4–5)
ANION GAP SERPL CALCULATED.3IONS-SCNC: 12.7 MMOL/L (ref 8–16)
AST SERPL-CCNC: 476 U/L (ref 15–37)
BASOPHILS # BLD AUTO: 0 K/UL (ref 0–0.22)
BASOPHILS NFR BLD AUTO: 0.5 % (ref 0–2)
BILIRUB DIRECT SERPL-MCNC: 0.2 MG/DL (ref 0–0.3)
BILIRUB SERPL-MCNC: 0.4 MG/DL (ref 0–1)
BUN SERPL-MCNC: 7 MG/DL (ref 7–18)
CHLORIDE SERPL-SCNC: 107 MMOL/L (ref 98–107)
CO2 SERPL-SCNC: 24.8 MMOL/L (ref 21–32)
CREAT SERPL-MCNC: 0.7 MG/DL (ref 0.6–1.3)
EOSINOPHIL # BLD AUTO: 0.2 K/UL (ref 0–0.4)
EOSINOPHIL NFR BLD AUTO: 4.1 % (ref 0–4)
ERYTHROCYTE [DISTWIDTH] IN BLOOD BY AUTOMATED COUNT: 13.7 % (ref 11.6–13.7)
GFR SERPL CREATININE-BSD FRML MDRD: 119 ML/MIN (ref 90–?)
GLUCOSE SERPL-MCNC: 87 MG/DL (ref 74–106)
HCT VFR BLD AUTO: 39 % (ref 36–48)
HGB BLD-MCNC: 12.8 G/DL (ref 12–16)
LYMPHOCYTES # BLD AUTO: 1.8 K/UL (ref 2.5–16.5)
LYMPHOCYTES NFR BLD AUTO: 46.3 % (ref 20.5–51.1)
MAGNESIUM SERPL-MCNC: 1.8 MG/DL (ref 1.8–2.4)
MCH RBC QN AUTO: 28 PG (ref 27–31)
MCHC RBC AUTO-ENTMCNC: 33 G/DL (ref 33–37)
MCV RBC AUTO: 86 FL (ref 80–94)
MONOCYTES # BLD AUTO: 0.7 K/UL (ref 0.8–1)
MONOCYTES NFR BLD AUTO: 18.3 % (ref 1.7–9.3)
NEUTROPHILS # BLD AUTO: 1.2 K/UL (ref 1.8–7.7)
NEUTROPHILS NFR BLD AUTO: 30.8 % (ref 42.2–75.2)
PHOSPHATE SERPL-MCNC: 2.9 MG/DL (ref 2.5–4.9)
PLATELET # BLD AUTO: 242 K/UL (ref 140–450)
POTASSIUM SERPL-SCNC: 3.5 MMOL/L (ref 3.5–5.1)
RBC # BLD AUTO: 4.54 MIL/UL (ref 4.2–5.4)
SODIUM SERPL-SCNC: 141 MMOL/L (ref 136–145)
T4 SERPL-MCNC: 8.1 UG/DL (ref 4.5–12)
WBC # BLD AUTO: 3.8 K/UL (ref 4.8–10.8)

## 2019-12-31 RX ADMIN — KETOROLAC TROMETHAMINE PRN MG: 30 INJECTION, SOLUTION INTRAMUSCULAR; INTRAVENOUS at 08:34

## 2019-12-31 RX ADMIN — DEXTROSE AND SODIUM CHLORIDE SCH MLS/HR: 5; .9 INJECTION, SOLUTION INTRAVENOUS at 14:20

## 2019-12-31 RX ADMIN — DEXTROSE AND SODIUM CHLORIDE SCH MLS/HR: 5; .9 INJECTION, SOLUTION INTRAVENOUS at 04:01

## 2019-12-31 RX ADMIN — PANTOPRAZOLE SODIUM SCH MG: 40 INJECTION, POWDER, FOR SOLUTION INTRAVENOUS at 08:34

## 2019-12-31 NOTE — NUR
PATIENT EDUCATED ON DISCHARGED INSTRUCTIONS. PATIENT VERBALIZED UNDERSTANDING, BELONGINGS 
ARE WITH PATIENT, PATIENT SIGNED DISCHARGE INSTUCTIONS AND ALL QUESTIONS WERE ANSWERED. 
PATIENT WILL GET DRESS FOR DISCHARGE

## 2019-12-31 NOTE — NUR
PT C/O ABD PAIN  8/10, PAIN MEDICATION PER DR ORDERED ADMINISTERED, PT TOLERATED WELL, NO 
DISTRESS NOTED, CALL LIGHT WITHIN REACH, WILL CONTINUE TO MONITOR.

## 2019-12-31 NOTE — NUR
RECEIVED BED SIDE REPORT FROM NIGHT SHIFT NURSE, PATIENT IS IN STABLE CONDITION. WILL 
CONTINUE TO MONITOR

## 2019-12-31 NOTE — NUR
PATIENT DISCHARGED AT THIS TIME, PATIENT IN STABLE CONDITION, MEDICATIONS WERE GIVEN TO 
PATIENT, NOTE FOR WORK WAS GIVEN TO PATIENT. PATIENT AT THE BED SIDE WITH MOTHER WHO IS 
DRIVING HER HOME, IV DISCONTINUED WITH LUMEN IN TACT, NO REDNESS OR SWELLING NOTED. ID BAND 
REMOVED. PATIENT'S GAIT IS STEADY AND WAS ESCORTED TO THE LOBBY FOR DISCHARGE

## 2021-05-19 ENCOUNTER — HOSPITAL ENCOUNTER (INPATIENT)
Dept: HOSPITAL 26 - MED | Age: 41
LOS: 3 days | Discharge: HOME | DRG: 710 | End: 2021-05-22
Attending: FAMILY MEDICINE | Admitting: FAMILY MEDICINE
Payer: MEDICAID

## 2021-05-19 VITALS — BODY MASS INDEX: 28.64 KG/M2 | HEIGHT: 68 IN | WEIGHT: 189 LBS

## 2021-05-19 VITALS — SYSTOLIC BLOOD PRESSURE: 126 MMHG | DIASTOLIC BLOOD PRESSURE: 84 MMHG

## 2021-05-19 VITALS — DIASTOLIC BLOOD PRESSURE: 87 MMHG | SYSTOLIC BLOOD PRESSURE: 118 MMHG

## 2021-05-19 VITALS — SYSTOLIC BLOOD PRESSURE: 123 MMHG | DIASTOLIC BLOOD PRESSURE: 85 MMHG

## 2021-05-19 VITALS — SYSTOLIC BLOOD PRESSURE: 116 MMHG | DIASTOLIC BLOOD PRESSURE: 77 MMHG

## 2021-05-19 DIAGNOSIS — E43: ICD-10-CM

## 2021-05-19 DIAGNOSIS — D64.9: ICD-10-CM

## 2021-05-19 DIAGNOSIS — E86.0: ICD-10-CM

## 2021-05-19 DIAGNOSIS — N39.0: ICD-10-CM

## 2021-05-19 DIAGNOSIS — K80.00: ICD-10-CM

## 2021-05-19 DIAGNOSIS — Z20.822: ICD-10-CM

## 2021-05-19 DIAGNOSIS — A41.9: Primary | ICD-10-CM

## 2021-05-19 DIAGNOSIS — E87.6: ICD-10-CM

## 2021-05-19 DIAGNOSIS — K85.10: ICD-10-CM

## 2021-05-19 DIAGNOSIS — K76.0: ICD-10-CM

## 2021-05-19 DIAGNOSIS — K20.90: ICD-10-CM

## 2021-05-19 LAB
ALBUMIN FLD-MCNC: 3.4 G/DL (ref 3.4–5)
AMYLASE SERPL-CCNC: 692 U/L (ref 25–115)
ANION GAP SERPL CALCULATED.3IONS-SCNC: 13.6 MMOL/L (ref 8–16)
APPEARANCE UR: CLEAR
AST SERPL-CCNC: 168 U/L (ref 15–37)
BARBITURATES UR QL SCN: NEGATIVE NG/ML
BASOPHILS # BLD AUTO: 0 K/UL (ref 0–0.22)
BASOPHILS NFR BLD AUTO: 0 % (ref 0–2)
BENZODIAZ UR QL SCN: NEGATIVE NG/ML
BILIRUB SERPL-MCNC: 1.1 MG/DL (ref 0–1)
BILIRUB UR QL STRIP: NEGATIVE
BUN SERPL-MCNC: 12 MG/DL (ref 7–18)
BZE UR QL SCN: NEGATIVE NG/ML
CANNABINOIDS UR QL SCN: NEGATIVE NG/ML
CHLORIDE SERPL-SCNC: 103 MMOL/L (ref 98–107)
CHOLEST/HDLC SERPL: 2.4 {RATIO} (ref 1–4.5)
CO2 SERPL-SCNC: 24.8 MMOL/L (ref 21–32)
COLOR UR: YELLOW
CREAT SERPL-MCNC: 0.7 MG/DL (ref 0.6–1.3)
EOSINOPHIL # BLD AUTO: 0.2 K/UL (ref 0–0.4)
EOSINOPHIL NFR BLD AUTO: 1.7 % (ref 0–4)
ERYTHROCYTE [DISTWIDTH] IN BLOOD BY AUTOMATED COUNT: 13.4 % (ref 11.6–13.7)
GFR SERPL CREATININE-BSD FRML MDRD: 119 ML/MIN (ref 90–?)
GLUCOSE SERPL-MCNC: 113 MG/DL (ref 74–106)
GLUCOSE UR STRIP-MCNC: NEGATIVE MG/DL
HCT VFR BLD AUTO: 39.5 % (ref 36–48)
HDLC SERPL-MCNC: 69 MG/DL (ref 40–60)
HGB BLD-MCNC: 13.2 G/DL (ref 12–16)
HGB UR QL STRIP: NEGATIVE
LDLC SERPL CALC-MCNC: 87 MG/DL (ref 60–100)
LEUKOCYTE ESTERASE UR QL STRIP: (no result)
LYMPHOCYTES # BLD AUTO: 1.4 K/UL (ref 2.5–16.5)
LYMPHOCYTES NFR BLD AUTO: 13.6 % (ref 20.5–51.1)
MAGNESIUM SERPL-MCNC: 1.8 MG/DL (ref 1.8–2.4)
MCH RBC QN AUTO: 28 PG (ref 27–31)
MCHC RBC AUTO-ENTMCNC: 34 G/DL (ref 33–37)
MCV RBC AUTO: 84.2 FL (ref 80–94)
MONOCYTES # BLD AUTO: 0.5 K/UL (ref 0.8–1)
MONOCYTES NFR BLD AUTO: 5.2 % (ref 1.7–9.3)
NEUTROPHILS # BLD AUTO: 8.3 K/UL (ref 1.8–7.7)
NEUTROPHILS NFR BLD AUTO: 79.5 % (ref 42.2–75.2)
NITRITE UR QL STRIP: NEGATIVE
OPIATES UR QL SCN: POSITIVE NG/ML
PCP UR QL SCN: NEGATIVE NG/ML
PH UR STRIP: 6 [PH] (ref 5–9)
PHOSPHATE SERPL-MCNC: 2.5 MG/DL (ref 2.5–4.9)
PLATELET # BLD AUTO: 322 K/UL (ref 140–450)
POTASSIUM SERPL-SCNC: 3.4 MMOL/L (ref 3.5–5.1)
PROTHROMBIN TIME: 10.3 SECS (ref 10.8–13.4)
RBC # BLD AUTO: 4.69 MIL/UL (ref 4.2–5.4)
RBC #/AREA URNS HPF: (no result) /HPF (ref 0–5)
SODIUM SERPL-SCNC: 138 MMOL/L (ref 136–145)
T4 FREE SERPL-MCNC: 0.99 NG/DL (ref 0.76–1.46)
TRIGL SERPL-MCNC: 61 MG/DL (ref 30–150)
TSH SERPL DL<=0.05 MIU/L-ACNC: 4.79 UIU/ML (ref 0.34–3.74)
WBC # BLD AUTO: 10.5 K/UL (ref 4.8–10.8)
WBC,URINE: (no result) /HPF (ref 0–5)

## 2021-05-19 PROCEDURE — 0F798ZZ DILATION OF COMMON BILE DUCT, VIA NATURAL OR ARTIFICIAL OPENING ENDOSCOPIC: ICD-10-PCS | Performed by: INTERNAL MEDICINE

## 2021-05-19 PROCEDURE — C1769 GUIDE WIRE: HCPCS

## 2021-05-19 PROCEDURE — BF111ZZ FLUOROSCOPY OF BILIARY AND PANCREATIC DUCTS USING LOW OSMOLAR CONTRAST: ICD-10-PCS | Performed by: INTERNAL MEDICINE

## 2021-05-19 PROCEDURE — 0FC98ZZ EXTIRPATION OF MATTER FROM COMMON BILE DUCT, VIA NATURAL OR ARTIFICIAL OPENING ENDOSCOPIC: ICD-10-PCS | Performed by: INTERNAL MEDICINE

## 2021-05-19 RX ADMIN — HYDROCODONE BITARTRATE AND ACETAMINOPHEN PRN TAB: 7.5; 325 TABLET ORAL at 20:14

## 2021-05-19 RX ADMIN — ATORVASTATIN CALCIUM SCH MG: 20 TABLET, FILM COATED ORAL at 09:00

## 2021-05-19 RX ADMIN — MORPHINE SULFATE PRN MG: 2 INJECTION, SOLUTION INTRAMUSCULAR; INTRAVENOUS at 17:57

## 2021-05-19 RX ADMIN — DEXTROSE AND SODIUM CHLORIDE SCH MLS/HR: 5; .9 INJECTION, SOLUTION INTRAVENOUS at 20:21

## 2021-05-19 RX ADMIN — SODIUM CHLORIDE PRN MG: 9 INJECTION, SOLUTION INTRAVENOUS at 19:09

## 2021-05-19 RX ADMIN — MORPHINE SULFATE PRN MG: 2 INJECTION, SOLUTION INTRAMUSCULAR; INTRAVENOUS at 10:51

## 2021-05-19 RX ADMIN — DEXTROSE AND SODIUM CHLORIDE SCH MLS/HR: 5; .9 INJECTION, SOLUTION INTRAVENOUS at 07:10

## 2021-05-19 RX ADMIN — PANTOPRAZOLE SODIUM SCH MG: 40 TABLET, DELAYED RELEASE ORAL at 09:00

## 2021-05-19 RX ADMIN — DEXTROSE AND SODIUM CHLORIDE SCH MLS/HR: 5; .9 INJECTION, SOLUTION INTRAVENOUS at 14:10

## 2021-05-19 NOTE — NUR
PATIENT ENDORSED BY DAY SHIFT RN FOR CONTINUITY OF CARE. PATIENT ON THE SIDE OF THE BED 
REPORTING SEVERE NAUSEA. DAY SHIFT ADMINISTERED PRN NAUSEA MEDICATION. PATIENT IS ALERT AND 
ORIENTED X4. PATIENT SKIN DRY, CLEAN AND INTACT. PATIENT COMPLAINS OF 8 PAIN WITH NO RELIEVE 
FROM THE PAIN MEDICATION GIVEN AT 1757. PATIENT ON STANDARD PRECAUTIONS. SAFETY MEASURES IN 
PLACE. CALL LIGHT WITHIN REACH.

## 2021-05-19 NOTE — NUR
PT COMPLAINS OF NAUSEA. PRN ZOFRAN ADMINISTERED AS PRESCRIBED PER MD ORDER. MEDICATION 
EDUCATION PERFORMED. PT VERBALIZED UNDERSTANDING. SAFETY MEASURES IN PLACE. WILL CONTINUE TO 
MONITOR

## 2021-05-19 NOTE — NUR
PATIENT OFF TO ERCP. REPORT GIVEN AT BEDSIDE TO OR NURSE. SAFETY MEASURES IN PLACE. WILL 
CONTINUE TO MONITOR

## 2021-05-19 NOTE — NUR
Patient will be admitted to care of Dr. Schumacher. Admited to Med/Surg.  Will go to 
room 119-B. Belongings list completed. Report to Dinora OWEN.

## 2021-05-19 NOTE — NUR
MATHEW CALLED AND WANTED REPORT. TELEPHONE ORDERS RECEIVED. SAFETY MEASURES IN PLACE. 
WILL CONTINUE TO MONITOR

## 2021-05-19 NOTE — NUR
ROUNDED ON PATIENT. PATIENT IS ASLEEP, WITH NO SIGNS OF ACUTE DISTRESS OR PAIN. SAFETY 
MEASURES IN PLACE. CALL LIGHT WITHIN REACH, WILL CONTINUE TO MONITOR.

## 2021-05-19 NOTE — NUR
RECEIVED BEDSIDE REPORT FROM DAY RN. PT IS AAOX4. AMBULATORY ABLE TO MAKE NEEDS KNOWN. PT 
STATES FEELING NAUSEAS EMESIS BAG WITH PT WILL MEDICATE WITH PRN ZOFRAN. C/C EPIGASTRIC PAIN 
AND N/V DX PANCREATITIS. PT IS NPO EXCEPT MEDS. PT HAD ERCP EARLIER TODAY WITH DR HILLMAN WITH 
SPHINCTEROTOMY AND REMOVAL OF STONES FROM COMMON BILE DUCT. PT STILL EXPERIENCING PAIN AND 
N/V. IV ON R FA D5NS INFUSING AT 150ML/H. SKIN IS INTACT. PT LIPASE HIGH 4701. POC REVIEWED 
WITH PT. CALL LIGHT IS WITHIN REACH. WILL CONTINUE TO MONITOR.

## 2021-05-19 NOTE — NUR
DC PL[age] YRS OLD FEMALE PATIENT WAS ADMITTED FROM HOME WITH A DX OF PANCREATITIS. PT HAS NO 
MEDICAL HISTORY. CXR SHOWED NO ACUTE FINDINGS. RAPID COVID TEST NEGATIVE.  CT ABD AND US ABD 
SHOWED ACUTE CHOLECYSTITIS. LIPASE AND AMYLASE LEVEL 4523  . ADMINISTERED IVF, IV ABX 
ROCEPHIN ND PAIN MEDS. CONSULTED WITH SURGEON AND GI DR HILLMAN. DC PLAN TO GO HOME WHEN STABLE 
CM TO FOLLOW.

## 2021-05-19 NOTE — NUR
PATIENT REPORTED A DECREASE IN PAIN TO A 7 BUT STATES IT IS STILL INTOLERABLE. REQUESTING 
PRN PAIN MEDICATION. EDUCATION PROVIDED ON MEDICATION, AS WELL AS NONPHARMACOLOGICAL 
INTERVENTIONS TO HELP REDUCE PAIN. /79 AND HR 95 PRIOR TO ADMINISTRATION OF PAIN 
MEDICATION. NEW IV BAG HUNG AS WELL. CALL LIGHT WITHIN REACH. SAFETY MEASURES IN PLACE. WILL 
CONTINUE TO MONITOR.

## 2021-05-19 NOTE — NUR
RECEIVED REPORT FORM ER NURSE AT 0805 PATIENT ALERT AND AWAKE X4. RESPIRATION EVEN AND 
UNLABORED  AT ROOM AIR . IV LC ON FFA 18 G . BED IN LOWER POSITION AND CLL LIGHT WITHIN 
REACH.

## 2021-05-19 NOTE — NUR
PATIENT COMPLAINED OF SEVERE PAIN. PRN MORPHINE ADMINISTERED AS PRESCRIBED PER MD ORDER. PT 
TOLERATED WELL. MEDICATION EDUCATION PERFORMED. PT VERBALIZED UNDERSTANDING. SAFETY MEASURES 
IN PLACE. WILL CONTINUE TO MONITOR

## 2021-05-19 NOTE — NUR
ADMINISTERED POTASSIUM AS PRESCRIBED PER MD ORDER. PT STATES SHE IS HESITANT BECAUSE SHE 
SAID LAST TIME SHE HAD IV POTASSIUM IT WAS TOO PAINFUL FOR HER. PT STATES SHE WILL NOT THINK 
ABOUT THE PAIN. MEDICATION EDUCATION PERFORMED. PT VERBALIZED UNDERSTANDING. SAFETY MEASURES 
IN PLACE. WILL CONTINUE TO MONITOR

## 2021-05-19 NOTE — NUR
PATIENT IS BACK TO UNIT FROM ERCP . VITAL SIGHS TAKEN WITHIN CARMEN LIMIT. PATIENT C/O PAIN 
7/10 WE WILL ADMINISTER PRN MEDICATION PRE ORDER. CONTINUE MONITOR PT.

## 2021-05-19 NOTE — NUR
PATIENT AMBULATED FROM RESTROOM WITH STEADY GAIT. PLACED BACK ON CARDIAC 
MONITOR, AWAITING TRANSFER TO M/S

## 2021-05-19 NOTE — NUR
DR BEAUCHAMP IS AT BEDSIDE EXPLAINING SURGERY TO PT. PT STATES NO FURTHER QUESTIONS OR 
CONCERNS REGARDING LAP ANNMARIE CONSENT OBTAINED AND PLACED IN CHART.

## 2021-05-19 NOTE — NUR
PATIENT RATES PAIN 8/10, ADMINISTERED PRN PAIN MEDICATION. BP PRIOR /68, HEART RATE 
78. PATIENT EDUCATED AND VERBALIZED UNDERSTANDING. WILL FOLLOW UP TO ENSURE PAIN RELIEF. 
CALL LIGHT WITHIN REACH. WILL CONTINUE TO MONITOR.

## 2021-05-19 NOTE — NUR
PT COULD NOT TOLERATED POTASSIUM IV. PT IN TEARS CRYING SAYING SHE CAN NOT TOLERATE IT 
ANYMORE. REMOVED FROM PT. PAGED DR. CARVAJAL AND AWAITING ORDERS. SAFETY MEASURES IN PLACE. WILL 
CONTINUE TO MONITOR

## 2021-05-19 NOTE — NUR
DOCTOR NOTIFIED OF PATIENT STILL IN PAIN, EVEN AFTER PRN PAIN MEDICATION. PATIENT RATES PAIN 
A 6. WAITING ON DOCTOR RESPONSE.

## 2021-05-20 VITALS — DIASTOLIC BLOOD PRESSURE: 68 MMHG | SYSTOLIC BLOOD PRESSURE: 110 MMHG

## 2021-05-20 VITALS — DIASTOLIC BLOOD PRESSURE: 75 MMHG | SYSTOLIC BLOOD PRESSURE: 121 MMHG

## 2021-05-20 VITALS — SYSTOLIC BLOOD PRESSURE: 110 MMHG | DIASTOLIC BLOOD PRESSURE: 71 MMHG

## 2021-05-20 LAB
ALBUMIN FLD-MCNC: 2.7 G/DL (ref 3.4–5)
ANION GAP SERPL CALCULATED.3IONS-SCNC: 11.1 MMOL/L (ref 8–16)
AST SERPL-CCNC: 205 U/L (ref 15–37)
BASOPHILS # BLD AUTO: 0 K/UL (ref 0–0.22)
BASOPHILS NFR BLD AUTO: 0.4 % (ref 0–2)
BILIRUB SERPL-MCNC: 0.6 MG/DL (ref 0–1)
BUN SERPL-MCNC: 4 MG/DL (ref 7–18)
CHLORIDE SERPL-SCNC: 104 MMOL/L (ref 98–107)
CO2 SERPL-SCNC: 24.6 MMOL/L (ref 21–32)
CREAT SERPL-MCNC: 0.6 MG/DL (ref 0.6–1.3)
EOSINOPHIL # BLD AUTO: 0.1 K/UL (ref 0–0.4)
EOSINOPHIL NFR BLD AUTO: 1.5 % (ref 0–4)
ERYTHROCYTE [DISTWIDTH] IN BLOOD BY AUTOMATED COUNT: 13.4 % (ref 11.6–13.7)
GFR SERPL CREATININE-BSD FRML MDRD: 142 ML/MIN (ref 90–?)
GLUCOSE SERPL-MCNC: 118 MG/DL (ref 74–106)
HCT VFR BLD AUTO: 35.3 % (ref 36–48)
HGB BLD-MCNC: 11.6 G/DL (ref 12–16)
LYMPHOCYTES # BLD AUTO: 1.9 K/UL (ref 2.5–16.5)
LYMPHOCYTES NFR BLD AUTO: 24.4 % (ref 20.5–51.1)
MCH RBC QN AUTO: 28 PG (ref 27–31)
MCHC RBC AUTO-ENTMCNC: 33 G/DL (ref 33–37)
MCV RBC AUTO: 85.6 FL (ref 80–94)
MONOCYTES # BLD AUTO: 0.9 K/UL (ref 0.8–1)
MONOCYTES NFR BLD AUTO: 11.2 % (ref 1.7–9.3)
NEUTROPHILS # BLD AUTO: 5 K/UL (ref 1.8–7.7)
NEUTROPHILS NFR BLD AUTO: 62.5 % (ref 42.2–75.2)
PLATELET # BLD AUTO: 309 K/UL (ref 140–450)
POTASSIUM SERPL-SCNC: 3.7 MMOL/L (ref 3.5–5.1)
RBC # BLD AUTO: 4.13 MIL/UL (ref 4.2–5.4)
SODIUM SERPL-SCNC: 136 MMOL/L (ref 136–145)
T3RU NFR SERPL: 24 % (ref 24–39)
T4 SERPL-MCNC: 7 UG/DL (ref 4.5–12)
WBC # BLD AUTO: 7.9 K/UL (ref 4.8–10.8)

## 2021-05-20 PROCEDURE — 0FT44ZZ RESECTION OF GALLBLADDER, PERCUTANEOUS ENDOSCOPIC APPROACH: ICD-10-PCS

## 2021-05-20 RX ADMIN — ATORVASTATIN CALCIUM SCH MG: 20 TABLET, FILM COATED ORAL at 08:42

## 2021-05-20 RX ADMIN — SODIUM CHLORIDE, SODIUM LACTATE, POTASSIUM CHLORIDE, AND CALCIUM CHLORIDE SCH MLS/HR: .6; .31; .03; .02 INJECTION, SOLUTION INTRAVENOUS at 21:33

## 2021-05-20 RX ADMIN — HYDROMORPHONE HYDROCHLORIDE PRN MG: 1 INJECTION, SOLUTION INTRAMUSCULAR; INTRAVENOUS; SUBCUTANEOUS at 20:45

## 2021-05-20 RX ADMIN — MORPHINE SULFATE PRN MG: 2 INJECTION, SOLUTION INTRAMUSCULAR; INTRAVENOUS at 12:31

## 2021-05-20 RX ADMIN — MORPHINE SULFATE PRN MG: 2 INJECTION, SOLUTION INTRAMUSCULAR; INTRAVENOUS at 21:56

## 2021-05-20 RX ADMIN — HYDROMORPHONE HYDROCHLORIDE PRN MG: 1 INJECTION, SOLUTION INTRAMUSCULAR; INTRAVENOUS; SUBCUTANEOUS at 20:35

## 2021-05-20 RX ADMIN — PANTOPRAZOLE SODIUM SCH MG: 40 TABLET, DELAYED RELEASE ORAL at 08:43

## 2021-05-20 RX ADMIN — SODIUM CHLORIDE PRN MG: 9 INJECTION, SOLUTION INTRAVENOUS at 12:56

## 2021-05-20 RX ADMIN — MORPHINE SULFATE PRN MG: 2 INJECTION, SOLUTION INTRAMUSCULAR; INTRAVENOUS at 03:14

## 2021-05-20 RX ADMIN — MORPHINE SULFATE PRN MG: 2 INJECTION, SOLUTION INTRAMUSCULAR; INTRAVENOUS at 08:44

## 2021-05-20 RX ADMIN — DEXTROSE AND SODIUM CHLORIDE SCH MLS/HR: 5; .9 INJECTION, SOLUTION INTRAVENOUS at 17:28

## 2021-05-20 RX ADMIN — DEXTROSE AND SODIUM CHLORIDE SCH MLS/HR: 5; .9 INJECTION, SOLUTION INTRAVENOUS at 04:19

## 2021-05-20 RX ADMIN — DEXTROSE AND SODIUM CHLORIDE SCH MLS/HR: 5; .9 INJECTION, SOLUTION INTRAVENOUS at 10:08

## 2021-05-20 RX ADMIN — SODIUM CHLORIDE PRN MG: 9 INJECTION, SOLUTION INTRAVENOUS at 04:27

## 2021-05-20 RX ADMIN — SODIUM CHLORIDE PRN MG: 9 INJECTION, SOLUTION INTRAVENOUS at 08:45

## 2021-05-20 NOTE — NUR
RECEIVED BEDSIDE REPORT FROM NIGHTSHIFT NURSE. PT RESTING IN BED. ABLE TO MAKE NEEDS KNOWN. 
RESPIRATIONS EVEN AND UNLABORED WITH NO SOB OR RESPIRATORY DISTRESS. SKIN WARM AND DRY TO 
TOUCH. IV SITE IN RFA 18G IS CLEAN, DRY, AND INTACT. SAFETY MEASURES IN PLACE. WILL CONTINUE 
TO MONITOR

## 2021-05-20 NOTE — NUR
PT BROTHER BROUGHT BAG OF BELONGINGS TO PATIENT. BAG GIVEN TO PATIENT. SAFETY MEASURES IN 
PLACE. WILL CONTINUE TO MONITOR

## 2021-05-20 NOTE — NUR
PATIENT SITTING ON EDGE OF BED WITH EMESIS BAG. PT COMPLAINS OF NAUSEA. PRN ZOFRAN 
ADMINISTERED AS PRESCRIBED PER MD ORDER. MEDICATION EDUCATION PERFORMED. PT VERBALIZED 
UNDERSTANDING. SAFETY MEASURES IN PLACE. WILL CONTINUE TO MONITOR

## 2021-05-20 NOTE — NUR
PT COMPLAINED OF INTACT IV PAIN IN RFA 18G. IV UNABLE TO FLUSH OR RETURN DRAWBACK. NEW IV 
STARTED IN LAC 20G IS CLEAN, DRY, AND INTACT. PT TOLERATED WELL. SAFETY MEASURES IN PLACE. 
WILL CONTINUE TO MONITOR

## 2021-05-20 NOTE — NUR
ADMINISTERED SCHED MED AS PRESCRIBED PER MD ORDER. PT COMPLAINED OF SEVERE PAIN AND NAUSEA. 
PRN ZOFRAN AND MORPHINE ADMINISTERED AS PRESCRIBED PER MD ORDER. PT TOLERATED WELL. 
MEDICATION EDUCATION PERFORMED. PT VERBALIZED UNDERSTANDING. SAFETY MEASURES IN PLACE. WILL 
CONTINUE TO MONITOR

## 2021-05-20 NOTE — NUR
PT FAMILY BROUGHT IN FLOWERS FOR PATIENT. FLOWERS DELIVERED TO PATIENT. SAFETY MEASURES IN 
PLACE. WILL CONTINUE TO MONITOR

## 2021-05-20 NOTE — NUR
5/20/21 RD INITIAL ASSESSMENT COMPLETED



PLEASE REFER TO NUTRITION ASSESSMENT UNDER CARE ACTIVITY FOR ESTIMATED NUTRITIONAL NEEDS. 



1. CONTINUE NPO

2. CONSIDER A CARDIAC DIET WHEN MEDICALLY CLEARED

3. RD TO FOLLOW-UP 2-3 DAYS, HIGH RISK 



KARINE DELGADO, RD

## 2021-05-20 NOTE — NUR
PATIENT IS BACK TO HER ROOM. W/ 5 SURGICAL INCISIONS ON THE ABDOMINAL AREA, DRESSING DRY AND 
INTACT. V/S MONITORED PER PROTOCOL, PAIN LEVEL TOLERABLE 1/10. KEPT COMFORTABLE, SAFETY 
MEASURES IN PLACE, CALL LIGHT WITHIN REACH.

## 2021-05-20 NOTE — NUR
PT COMPLAINED OF SEVERE PAIN. PRN MORPHINE ADMINISTERED AS PRESCRIBED PER MD ORDER. PT 
TOLERATED WELL. SAFETY MEASURES IN PLACE. WILL CONTINUE TO MONITOR

## 2021-05-20 NOTE — NUR
ROUNDED ON PATIENT, PATIENT RESTING COMFORTABLY IN BED. NO SIGNS OF ACUTE DISTRESS. SAFETY 
MEASURES IN PLACE. CALL LIGHT WITH IN REACH. WILL CONTINUE TO MONITOR.

## 2021-05-20 NOTE — NUR
ASLEEP, AROUSABLE TO VERBAL, PAIN IS TOLERABLE 1/10, WILL CONTINUE TO MONITOR, KEPT 
COMFORTABLE, CALL LIGHT WITHIN REACH.

## 2021-05-20 NOTE — NUR
ROUNDED ON PATIENT. PATIENT COMPLAINED OF NAUSEA BUT NO VOMITING. PRN ANTIEMETIC MEDICATION 
ADMINISTERED. EDUCATION PROVIDED ON MEDICATION. PATIENT VERBALIZED UNDERSTANDING. SAFETY 
MEASURES IN PLACE. CALL LIGHT WITHIN REACH. WILL CONTINUE TO MONITOR.

## 2021-05-20 NOTE — NUR
PATIENT HAS BEEN SCREENED AND CATEGORIZED AS HIGH NUTRITION RISK. PATIENT WILL BE SEEN 
WITHIN 1-2 DAYS OF ADMISSION.





FNS REFERRAL RECEIVED FOR NAUSEA AND VOMITING >3 DAYS.



05/20/21



KARINE DELGADO RD

## 2021-05-21 VITALS — DIASTOLIC BLOOD PRESSURE: 79 MMHG | SYSTOLIC BLOOD PRESSURE: 118 MMHG

## 2021-05-21 VITALS — DIASTOLIC BLOOD PRESSURE: 84 MMHG | SYSTOLIC BLOOD PRESSURE: 120 MMHG

## 2021-05-21 VITALS — SYSTOLIC BLOOD PRESSURE: 104 MMHG | DIASTOLIC BLOOD PRESSURE: 83 MMHG

## 2021-05-21 VITALS — DIASTOLIC BLOOD PRESSURE: 77 MMHG | SYSTOLIC BLOOD PRESSURE: 129 MMHG

## 2021-05-21 LAB
ALBUMIN FLD-MCNC: 2.8 G/DL (ref 3.4–5)
ANION GAP SERPL CALCULATED.3IONS-SCNC: 11.5 MMOL/L (ref 8–16)
AST SERPL-CCNC: 151 U/L (ref 15–37)
BASOPHILS # BLD AUTO: 0 K/UL (ref 0–0.22)
BASOPHILS NFR BLD AUTO: 0.2 % (ref 0–2)
BILIRUB SERPL-MCNC: 0.5 MG/DL (ref 0–1)
BUN SERPL-MCNC: 4 MG/DL (ref 7–18)
CHLORIDE SERPL-SCNC: 103 MMOL/L (ref 98–107)
CO2 SERPL-SCNC: 26.6 MMOL/L (ref 21–32)
CREAT SERPL-MCNC: 0.6 MG/DL (ref 0.6–1.3)
EOSINOPHIL # BLD AUTO: 0 K/UL (ref 0–0.4)
EOSINOPHIL NFR BLD AUTO: 0 % (ref 0–4)
ERYTHROCYTE [DISTWIDTH] IN BLOOD BY AUTOMATED COUNT: 13.4 % (ref 11.6–13.7)
GFR SERPL CREATININE-BSD FRML MDRD: 142 ML/MIN (ref 90–?)
GLUCOSE SERPL-MCNC: 146 MG/DL (ref 74–106)
HCT VFR BLD AUTO: 35.4 % (ref 36–48)
HGB BLD-MCNC: 11.7 G/DL (ref 12–16)
LYMPHOCYTES # BLD AUTO: 1.1 K/UL (ref 2.5–16.5)
LYMPHOCYTES NFR BLD AUTO: 8.4 % (ref 20.5–51.1)
MCH RBC QN AUTO: 28 PG (ref 27–31)
MCHC RBC AUTO-ENTMCNC: 33 G/DL (ref 33–37)
MCV RBC AUTO: 84.5 FL (ref 80–94)
MONOCYTES # BLD AUTO: 1 K/UL (ref 0.8–1)
MONOCYTES NFR BLD AUTO: 7.4 % (ref 1.7–9.3)
NEUTROPHILS # BLD AUTO: 11.2 K/UL (ref 1.8–7.7)
NEUTROPHILS NFR BLD AUTO: 84 % (ref 42.2–75.2)
PLATELET # BLD AUTO: 301 K/UL (ref 140–450)
POTASSIUM SERPL-SCNC: 4.1 MMOL/L (ref 3.5–5.1)
RBC # BLD AUTO: 4.19 MIL/UL (ref 4.2–5.4)
SODIUM SERPL-SCNC: 137 MMOL/L (ref 136–145)
WBC # BLD AUTO: 13.4 K/UL (ref 4.8–10.8)

## 2021-05-21 RX ADMIN — SODIUM CHLORIDE PRN MG: 9 INJECTION, SOLUTION INTRAVENOUS at 23:32

## 2021-05-21 RX ADMIN — PANTOPRAZOLE SODIUM SCH MG: 40 TABLET, DELAYED RELEASE ORAL at 09:13

## 2021-05-21 RX ADMIN — SODIUM CHLORIDE, SODIUM LACTATE, POTASSIUM CHLORIDE, AND CALCIUM CHLORIDE SCH MLS/HR: .6; .31; .03; .02 INJECTION, SOLUTION INTRAVENOUS at 23:40

## 2021-05-21 RX ADMIN — MORPHINE SULFATE PRN MG: 2 INJECTION, SOLUTION INTRAMUSCULAR; INTRAVENOUS at 15:30

## 2021-05-21 RX ADMIN — ATORVASTATIN CALCIUM SCH MG: 20 TABLET, FILM COATED ORAL at 09:13

## 2021-05-21 RX ADMIN — SODIUM CHLORIDE, SODIUM LACTATE, POTASSIUM CHLORIDE, AND CALCIUM CHLORIDE SCH MLS/HR: .6; .31; .03; .02 INJECTION, SOLUTION INTRAVENOUS at 06:21

## 2021-05-21 RX ADMIN — HYDROCODONE BITARTRATE AND ACETAMINOPHEN PRN TAB: 7.5; 325 TABLET ORAL at 16:15

## 2021-05-21 RX ADMIN — HYDROCODONE BITARTRATE AND ACETAMINOPHEN PRN TAB: 7.5; 325 TABLET ORAL at 10:38

## 2021-05-21 RX ADMIN — SODIUM CHLORIDE, SODIUM LACTATE, POTASSIUM CHLORIDE, AND CALCIUM CHLORIDE SCH MLS/HR: .6; .31; .03; .02 INJECTION, SOLUTION INTRAVENOUS at 16:15

## 2021-05-21 RX ADMIN — HYDROMORPHONE HYDROCHLORIDE PRN MG: 1 INJECTION, SOLUTION INTRAMUSCULAR; INTRAVENOUS; SUBCUTANEOUS at 03:47

## 2021-05-21 RX ADMIN — MORPHINE SULFATE PRN MG: 2 INJECTION, SOLUTION INTRAMUSCULAR; INTRAVENOUS at 09:38

## 2021-05-21 RX ADMIN — Medication SCH MG: at 09:13

## 2021-05-21 RX ADMIN — MORPHINE SULFATE PRN MG: 2 INJECTION, SOLUTION INTRAMUSCULAR; INTRAVENOUS at 19:36

## 2021-05-21 RX ADMIN — MORPHINE SULFATE PRN MG: 2 INJECTION, SOLUTION INTRAMUSCULAR; INTRAVENOUS at 06:37

## 2021-05-21 RX ADMIN — SODIUM CHLORIDE PRN MG: 9 INJECTION, SOLUTION INTRAVENOUS at 01:00

## 2021-05-21 RX ADMIN — SODIUM CHLORIDE PRN MG: 9 INJECTION, SOLUTION INTRAVENOUS at 06:44

## 2021-05-21 RX ADMIN — MORPHINE SULFATE PRN MG: 2 INJECTION, SOLUTION INTRAMUSCULAR; INTRAVENOUS at 23:34

## 2021-05-21 RX ADMIN — HYDROCODONE BITARTRATE AND ACETAMINOPHEN PRN TAB: 7.5; 325 TABLET ORAL at 21:08

## 2021-05-21 RX ADMIN — MORPHINE SULFATE PRN MG: 2 INJECTION, SOLUTION INTRAMUSCULAR; INTRAVENOUS at 01:02

## 2021-05-21 NOTE — NUR
PT STABLE, ALL NEEDS ATTENDED, NO DISTRESS, BEDSIDE ENDORSEMENT GIVEN TO AM SHIFT RN FOR 
CONTINUITY OF CARE.

## 2021-05-21 NOTE — NUR
RECEIVED BEDSIDE ENDORSEMENT FROM AM SHIFT RN. AAOX4, ON ROOM AIR, C/O ABD PAIN 7/10, 
ENCOURAGED RELAXATION, WILL ALSO GIVE PRN PAIN MED AS ORDERED, SAFETY MEASURES IN PLACE, 
PLAN OF CARE DISCUSSED, CALL LIGHT WITHIN REACH.

## 2021-05-21 NOTE — NUR
C/O ABD PAIN 7/10, ENCOURAGED RELAXATION, DEEP BREATHING, MORPHINE IVP GIVEN PRN FOR SEVERE 
PAIN AS ORDERED, TOLERATED WELL, IVF FINISHED, HANGED A NEW LR 1L  ML/HR AS ORDERED, 
ZOFRAN IVP ALSO GIVEN BECAUSE PT C/O NAUSEA.

## 2021-05-21 NOTE — NUR
ABDOMINAL DRESSING WAS CHANGED BY LEXIE CALDERA. PATIENT TOLERATED PROCEDURE WELL, KEPT 
COMFORTABLE, WILL CONTINUE TO MONITOR.

## 2021-05-21 NOTE — NUR
PATIENT REFUSED TO MOVE AROUND AND GET OUT OF BED WITH THE SCD DEVICE IN PLACE. OFFERED BED 
COMMODE TO PATIENT AND REFUSED. EDUCATED THE RISK AND BENEFITS OF MOVING AROUND AND PATIENT 
VERBALIZED UNDERSTAND AND STILL REFUSE

## 2021-05-21 NOTE — NUR
C/O ABD PAIN, 6/10. ENCOURAGED RELAXATION, NORCO TAB PRN GIVEN AS ORDERED, WILL CONTINUE TO 
MONITOR, KEPT COMFORTABLE, CALL LIGHT WITHIN REACH.

## 2021-05-21 NOTE — NUR
RECEIVED BEDSIDE REPORT FROM NIGHTSHIFT NURSE. PT IS AO X4. RESTING IN BED. ABLE TO MAKE 
NEEDS KNOWN. RESPIRATIONS EVEN AND UNLABORED WITH NO SOB OR RESPIRATORY DISTRESS NOTED. ON 
ROOM AIR SKIN WARM AND DRY TO TOUCH. IV SITE IN RH 20G AND LAC 24G. INTACT AND PATENT. SKIN 
IS WARM AND DRY. S/P LAP ANNMARIE 5/20. NOTED ABDOMINAL DRESSING WITH NO DRAINAGE. PLAN OF CARE 
DISCUSSED. SAFETY MEASURES IN PLACE. CALL LIGHT WITHIN REACH. WILL CONTINUE TO MONITOR

## 2021-05-21 NOTE — NUR
V/S TAKEN AND RECORDED, KEPT COMFORTABLE, GAVE WARM BLANKET PER PATIENT REQUEST, CALL LIGHT 
WITHIN REACH.

## 2021-05-22 VITALS — SYSTOLIC BLOOD PRESSURE: 127 MMHG | DIASTOLIC BLOOD PRESSURE: 85 MMHG

## 2021-05-22 LAB
ALBUMIN FLD-MCNC: 2.6 G/DL (ref 3.4–5)
ANION GAP SERPL CALCULATED.3IONS-SCNC: 10.1 MMOL/L (ref 8–16)
AST SERPL-CCNC: 100 U/L (ref 15–37)
BASOPHILS # BLD AUTO: 0 K/UL (ref 0–0.22)
BASOPHILS NFR BLD AUTO: 0.4 % (ref 0–2)
BILIRUB SERPL-MCNC: 0.6 MG/DL (ref 0–1)
BUN SERPL-MCNC: 6 MG/DL (ref 7–18)
CHLORIDE SERPL-SCNC: 102 MMOL/L (ref 98–107)
CO2 SERPL-SCNC: 27.7 MMOL/L (ref 21–32)
CREAT SERPL-MCNC: 0.7 MG/DL (ref 0.6–1.3)
EOSINOPHIL # BLD AUTO: 0.1 K/UL (ref 0–0.4)
EOSINOPHIL NFR BLD AUTO: 1 % (ref 0–4)
ERYTHROCYTE [DISTWIDTH] IN BLOOD BY AUTOMATED COUNT: 13.5 % (ref 11.6–13.7)
GFR SERPL CREATININE-BSD FRML MDRD: 119 ML/MIN (ref 90–?)
GLUCOSE SERPL-MCNC: 104 MG/DL (ref 74–106)
HCT VFR BLD AUTO: 34.9 % (ref 36–48)
HGB BLD-MCNC: 11.5 G/DL (ref 12–16)
LYMPHOCYTES # BLD AUTO: 2 K/UL (ref 2.5–16.5)
LYMPHOCYTES NFR BLD AUTO: 17.8 % (ref 20.5–51.1)
MCH RBC QN AUTO: 28 PG (ref 27–31)
MCHC RBC AUTO-ENTMCNC: 33 G/DL (ref 33–37)
MCV RBC AUTO: 85.6 FL (ref 80–94)
MONOCYTES # BLD AUTO: 1.4 K/UL (ref 0.8–1)
MONOCYTES NFR BLD AUTO: 12.5 % (ref 1.7–9.3)
NEUTROPHILS # BLD AUTO: 7.8 K/UL (ref 1.8–7.7)
NEUTROPHILS NFR BLD AUTO: 68.3 % (ref 42.2–75.2)
PLATELET # BLD AUTO: 311 K/UL (ref 140–450)
POTASSIUM SERPL-SCNC: 3.8 MMOL/L (ref 3.5–5.1)
RBC # BLD AUTO: 4.07 MIL/UL (ref 4.2–5.4)
SODIUM SERPL-SCNC: 136 MMOL/L (ref 136–145)
WBC # BLD AUTO: 11.4 K/UL (ref 4.8–10.8)

## 2021-05-22 RX ADMIN — PANTOPRAZOLE SODIUM SCH MG: 40 TABLET, DELAYED RELEASE ORAL at 08:30

## 2021-05-22 RX ADMIN — SODIUM CHLORIDE PRN MG: 9 INJECTION, SOLUTION INTRAVENOUS at 03:53

## 2021-05-22 RX ADMIN — SODIUM CHLORIDE PRN MG: 9 INJECTION, SOLUTION INTRAVENOUS at 08:28

## 2021-05-22 RX ADMIN — ATORVASTATIN CALCIUM SCH MG: 20 TABLET, FILM COATED ORAL at 08:28

## 2021-05-22 RX ADMIN — MORPHINE SULFATE PRN MG: 2 INJECTION, SOLUTION INTRAMUSCULAR; INTRAVENOUS at 03:53

## 2021-05-22 RX ADMIN — HYDROCODONE BITARTRATE AND ACETAMINOPHEN PRN TAB: 7.5; 325 TABLET ORAL at 13:54

## 2021-05-22 RX ADMIN — Medication SCH MG: at 08:30

## 2021-05-22 RX ADMIN — SODIUM CHLORIDE, SODIUM LACTATE, POTASSIUM CHLORIDE, AND CALCIUM CHLORIDE SCH MLS/HR: .6; .31; .03; .02 INJECTION, SOLUTION INTRAVENOUS at 11:17

## 2021-05-22 RX ADMIN — MORPHINE SULFATE PRN MG: 2 INJECTION, SOLUTION INTRAMUSCULAR; INTRAVENOUS at 08:34

## 2021-05-22 NOTE — NUR
C/O NAUSEA, ZOFRAN IV GIVEN PRN AS ORDERED, C/O ABD PAIN 9/10, SHARP, ENCOURAGED RELAXATION 
TECHNIQUE, PRN PAIN MED GIVEN AS ORDERED. CALL LIGHT WITHIN REACH.

## 2021-05-22 NOTE — NUR
DISCHARGED PT TO HOME, DISCHARGED TEACHINGS AND MEDICATION  INSTRUCTIONS GIVEN TO PT AND PT 
VERBALIZED UNDERSTANDING. IV LINE AND ARM BAND REMOVED. PT IS STABLE AT THIS TIME.

## 2021-05-22 NOTE — NUR
PT STABLE, NO DISTRESS, ALL NEEDS ATTENDED, KEPT COMFORTABLE, SAFETY MEASURES IN PLACE, CALL 
LIGHT WITHIN REACH.

## 2021-05-22 NOTE — NUR
5/22/21 RD FOLLOW UP COMPLETED

PLEASE REFER TO NUTRITION PROGRESS NOTE UNDER CARE ACTIVITY FOR ESTIMATED NUTRITION NEEDS.



RD RECOMMENDATIONS:



1. RECOMMEND CHANGING PTS DIET TO CARDIAC DIET. 



2. WILL CONTINUE TO MONITOR PO INTAKE AND WILL ADD ENSURE BID IF POOR PO INTAKE CONTINUES. 



3. LOW FAT DIET EDUCATION PROVIDED TO PT, HANDOUT ALSO PROVIDED.



4. RD TO FOLLOW-UP 2-3 DAYS, HIGH RISK 



RONNA WINSTON, RD

## 2021-05-22 NOTE — NUR
PT WAS GIVEN THE SCHEDULED AM MEDICATIONS NOW VIA ORAL, IV PUSH AND IVPB, PARAMETER CHECKED 
NAD WILL CONTINUE TO MONITOR PT.

## 2021-05-22 NOTE — NUR
RECEIVED PT FROM NIGHT SHIFT NURSE, JEFFY, PT IS AWAKE AND LYING ON THE BED WITH SIDE 
RAILS UP AND CALL LIGHT WITHIN REACH, IV LINES NOTED ON THE LAC G. 24 ON SALINE LOCK AND ON 
THE RT HAND G. 20 WITH LR INFUSING AT 100ML/HR, PT IS ON ROOM AIR AND IS S/P LAPAROSCOPIC 
CHOLECYSTECTOMY WITH 5 SURGICAL INCISIONS IN PLACE, DRESSING DRY AND INTACT. NO SIGNS OF 
DISTRESS NOTED AND WILL MONITOR PT.

## 2022-01-19 ENCOUNTER — HOSPITAL ENCOUNTER (EMERGENCY)
Dept: HOSPITAL 26 - MED | Age: 42
LOS: 1 days | Discharge: HOME | End: 2022-01-20
Payer: MEDICAID

## 2022-01-19 VITALS — DIASTOLIC BLOOD PRESSURE: 85 MMHG | SYSTOLIC BLOOD PRESSURE: 136 MMHG

## 2022-01-19 VITALS — BODY MASS INDEX: 32.18 KG/M2 | HEIGHT: 67 IN | WEIGHT: 205 LBS

## 2022-01-19 DIAGNOSIS — Z20.822: ICD-10-CM

## 2022-01-19 DIAGNOSIS — M79.602: ICD-10-CM

## 2022-01-19 DIAGNOSIS — R07.9: Primary | ICD-10-CM

## 2022-01-19 DIAGNOSIS — F41.9: ICD-10-CM

## 2022-01-19 PROCEDURE — 93005 ELECTROCARDIOGRAM TRACING: CPT

## 2022-01-19 PROCEDURE — 36415 COLL VENOUS BLD VENIPUNCTURE: CPT

## 2022-01-19 PROCEDURE — U0003 INFECTIOUS AGENT DETECTION BY NUCLEIC ACID (DNA OR RNA); SEVERE ACUTE RESPIRATORY SYNDROME CORONAVIRUS 2 (SARS-COV-2) (CORONAVIRUS DISEASE [COVID-19]), AMPLIFIED PROBE TECHNIQUE, MAKING USE OF HIGH THROUGHPUT TECHNOLOGIES AS DESCRIBED BY CMS-2020-01-R: HCPCS

## 2022-01-19 PROCEDURE — 80053 COMPREHEN METABOLIC PANEL: CPT

## 2022-01-19 PROCEDURE — 71045 X-RAY EXAM CHEST 1 VIEW: CPT

## 2022-01-19 PROCEDURE — 84484 ASSAY OF TROPONIN QUANT: CPT

## 2022-01-19 PROCEDURE — 99285 EMERGENCY DEPT VISIT HI MDM: CPT

## 2022-01-19 PROCEDURE — 85025 COMPLETE CBC W/AUTO DIFF WBC: CPT

## 2022-01-20 VITALS — SYSTOLIC BLOOD PRESSURE: 119 MMHG | DIASTOLIC BLOOD PRESSURE: 81 MMHG

## 2022-01-20 LAB
ALBUMIN FLD-MCNC: 3.6 G/DL (ref 3.4–5)
ANION GAP SERPL CALCULATED.3IONS-SCNC: 13.4 MMOL/L (ref 8–16)
AST SERPL-CCNC: 14 U/L (ref 15–37)
BASOPHILS # BLD AUTO: 0 K/UL (ref 0–0.22)
BASOPHILS NFR BLD AUTO: 0.5 % (ref 0–2)
BILIRUB SERPL-MCNC: 0.2 MG/DL (ref 0–1)
BUN SERPL-MCNC: 14 MG/DL (ref 7–18)
CHLORIDE SERPL-SCNC: 101 MMOL/L (ref 98–107)
CO2 SERPL-SCNC: 26.6 MMOL/L (ref 21–32)
CREAT SERPL-MCNC: 0.7 MG/DL (ref 0.6–1.3)
EOSINOPHIL # BLD AUTO: 0.2 K/UL (ref 0–0.4)
EOSINOPHIL NFR BLD AUTO: 2.1 % (ref 0–4)
ERYTHROCYTE [DISTWIDTH] IN BLOOD BY AUTOMATED COUNT: 13.9 % (ref 11.6–13.7)
GFR SERPL CREATININE-BSD FRML MDRD: 118 ML/MIN (ref 90–?)
GLUCOSE SERPL-MCNC: 101 MG/DL (ref 74–106)
HCT VFR BLD AUTO: 41.7 % (ref 36–48)
HGB BLD-MCNC: 14.2 G/DL (ref 12–16)
LYMPHOCYTES # BLD AUTO: 3.4 K/UL (ref 2.5–16.5)
LYMPHOCYTES NFR BLD AUTO: 35.9 % (ref 20.5–51.1)
MCH RBC QN AUTO: 28 PG (ref 27–31)
MCHC RBC AUTO-ENTMCNC: 34 G/DL (ref 33–37)
MCV RBC AUTO: 82.1 FL (ref 80–94)
MONOCYTES # BLD AUTO: 1 K/UL (ref 0.8–1)
MONOCYTES NFR BLD AUTO: 10.4 % (ref 1.7–9.3)
NEUTROPHILS # BLD AUTO: 4.8 K/UL (ref 1.8–7.7)
NEUTROPHILS NFR BLD AUTO: 51.1 % (ref 42.2–75.2)
PLATELET # BLD AUTO: 355 K/UL (ref 140–450)
POTASSIUM SERPL-SCNC: 4 MMOL/L (ref 3.5–5.1)
RBC # BLD AUTO: 5.09 MIL/UL (ref 4.2–5.4)
SODIUM SERPL-SCNC: 137 MMOL/L (ref 136–145)
WBC # BLD AUTO: 9.3 K/UL (ref 4.8–10.8)

## 2022-01-20 NOTE — NUR
Patient discharged with v/s stable. Written and verbal after care instructions 
given and explained. 

Patient alert, oriented and verbalized understanding of instructions. 
Ambulatory with steady gait. All questions addressed prior to discharge. ID 
band removed. Patient advised to follow up with PMD. Rx of PEPCID given. 
Patient educated on indication of medication including possible reaction and 
side effects. Opportunity to ask questions provided and answered.

## 2022-03-03 ENCOUNTER — HOSPITAL ENCOUNTER (EMERGENCY)
Dept: HOSPITAL 26 - MED | Age: 42
Discharge: HOME | End: 2022-03-03
Payer: MEDICAID

## 2022-03-03 VITALS — DIASTOLIC BLOOD PRESSURE: 98 MMHG | SYSTOLIC BLOOD PRESSURE: 146 MMHG

## 2022-03-03 VITALS — HEIGHT: 67 IN | BODY MASS INDEX: 30.61 KG/M2 | WEIGHT: 195 LBS

## 2022-03-03 VITALS — SYSTOLIC BLOOD PRESSURE: 146 MMHG | DIASTOLIC BLOOD PRESSURE: 98 MMHG

## 2022-03-03 DIAGNOSIS — M79.675: Primary | ICD-10-CM

## 2022-03-03 DIAGNOSIS — Z79.82: ICD-10-CM

## 2022-03-03 DIAGNOSIS — Z98.890: ICD-10-CM

## 2022-03-03 DIAGNOSIS — Z79.899: ICD-10-CM

## 2023-04-04 ENCOUNTER — HOSPITAL ENCOUNTER (EMERGENCY)
Dept: HOSPITAL 26 - MED | Age: 43
Discharge: HOME | End: 2023-04-04
Payer: SELF-PAY

## 2023-04-04 VITALS — SYSTOLIC BLOOD PRESSURE: 138 MMHG | DIASTOLIC BLOOD PRESSURE: 94 MMHG

## 2023-04-04 VITALS — HEIGHT: 67 IN | BODY MASS INDEX: 32.8 KG/M2 | WEIGHT: 209 LBS

## 2023-04-04 DIAGNOSIS — Z90.49: ICD-10-CM

## 2023-04-04 DIAGNOSIS — Z79.899: ICD-10-CM

## 2023-04-04 DIAGNOSIS — K42.9: ICD-10-CM

## 2023-04-04 DIAGNOSIS — N12: Primary | ICD-10-CM

## 2023-04-04 DIAGNOSIS — K76.9: ICD-10-CM

## 2023-04-04 LAB
ALBUMIN FLD-MCNC: 3.4 G/DL (ref 3.4–5)
ANION GAP SERPL CALCULATED.3IONS-SCNC: 10.5 MMOL/L (ref 8–16)
APPEARANCE UR: (no result)
AST SERPL-CCNC: 23 U/L (ref 15–37)
BASOPHILS # BLD AUTO: 0 K/UL (ref 0–0.22)
BASOPHILS NFR BLD AUTO: 0.2 % (ref 0–2)
BILIRUB SERPL-MCNC: 0.5 MG/DL (ref 0–1)
BILIRUB UR QL STRIP: (no result)
BUN SERPL-MCNC: 7 MG/DL (ref 7–18)
CHLORIDE SERPL-SCNC: 105 MMOL/L (ref 98–107)
CO2 SERPL-SCNC: 28.2 MMOL/L (ref 21–32)
COLOR UR: (no result)
CREAT SERPL-MCNC: 0.9 MG/DL (ref 0.6–1.3)
EOSINOPHIL # BLD AUTO: 0.1 K/UL (ref 0–0.4)
EOSINOPHIL NFR BLD AUTO: 1.3 % (ref 0–4)
ERYTHROCYTE [DISTWIDTH] IN BLOOD BY AUTOMATED COUNT: 13.5 % (ref 11.6–13.7)
GFR SERPL CREATININE-BSD FRML MDRD: 88 ML/MIN (ref 90–?)
GLUCOSE SERPL-MCNC: 126 MG/DL (ref 74–106)
GLUCOSE UR STRIP-MCNC: NEGATIVE MG/DL
HCT VFR BLD AUTO: 41.5 % (ref 36–48)
HGB BLD-MCNC: 13.8 G/DL (ref 12–16)
HGB UR QL STRIP: (no result)
LEUKOCYTE ESTERASE UR QL STRIP: (no result)
LYMPHOCYTES # BLD AUTO: 1.9 K/UL (ref 2.5–16.5)
LYMPHOCYTES NFR BLD AUTO: 17.2 % (ref 20.5–51.1)
MCH RBC QN AUTO: 28 PG (ref 27–31)
MCHC RBC AUTO-ENTMCNC: 33 G/DL (ref 33–37)
MCV RBC AUTO: 83.3 FL (ref 80–94)
MONOCYTES # BLD AUTO: 1 K/UL (ref 0.8–1)
MONOCYTES NFR BLD AUTO: 9 % (ref 1.7–9.3)
NEUTROPHILS # BLD AUTO: 7.9 K/UL (ref 1.8–7.7)
NEUTROPHILS NFR BLD AUTO: 72.3 % (ref 42.2–75.2)
NITRITE UR QL STRIP: NEGATIVE
PH UR STRIP: 6 [PH] (ref 5–9)
PLATELET # BLD AUTO: 340 K/UL (ref 140–450)
POTASSIUM SERPL-SCNC: 3.7 MMOL/L (ref 3.5–5.1)
RBC # BLD AUTO: 4.98 MIL/UL (ref 4.2–5.4)
RBC #/AREA URNS HPF: (no result) /HPF (ref 0–5)
SODIUM SERPL-SCNC: 140 MMOL/L (ref 136–145)
WBC # BLD AUTO: 10.9 K/UL (ref 4.8–10.8)
WBC,URINE: (no result) /HPF (ref 0–5)

## 2023-04-04 PROCEDURE — 83605 ASSAY OF LACTIC ACID: CPT

## 2023-04-04 PROCEDURE — 81001 URINALYSIS AUTO W/SCOPE: CPT

## 2023-04-04 PROCEDURE — 36415 COLL VENOUS BLD VENIPUNCTURE: CPT

## 2023-04-04 PROCEDURE — 80053 COMPREHEN METABOLIC PANEL: CPT

## 2023-04-04 PROCEDURE — 87040 BLOOD CULTURE FOR BACTERIA: CPT

## 2023-04-04 PROCEDURE — 81025 URINE PREGNANCY TEST: CPT

## 2023-04-04 PROCEDURE — 85025 COMPLETE CBC W/AUTO DIFF WBC: CPT

## 2023-04-04 PROCEDURE — 87086 URINE CULTURE/COLONY COUNT: CPT

## 2023-04-04 PROCEDURE — 99285 EMERGENCY DEPT VISIT HI MDM: CPT

## 2023-04-04 PROCEDURE — 96372 THER/PROPH/DIAG INJ SC/IM: CPT

## 2023-04-04 PROCEDURE — 74176 CT ABD & PELVIS W/O CONTRAST: CPT

## 2023-04-04 NOTE — NUR
Patient discharged with v/s stable. Written and verbal after care instructions 
ABOUT PYELONEPHRITIS given and explained. 

Patient alert, oriented and verbalized understanding of instructions. 
Ambulatory with steady gait. All questions addressed prior to discharge. ID 
band removed. Patient advised to follow up with PMD. Rx of TYLENOL, KEFLEX, 
PYRIDIUM given. Patient educated on indication of medication including possible 
reaction and side effects. Opportunity to ask questions provided and answered.

## 2024-09-16 ENCOUNTER — HOSPITAL ENCOUNTER (EMERGENCY)
Dept: HOSPITAL 26 - MED | Age: 44
Discharge: HOME | End: 2024-09-16
Payer: SELF-PAY

## 2024-09-16 VITALS
TEMPERATURE: 98.2 F | SYSTOLIC BLOOD PRESSURE: 145 MMHG | HEART RATE: 77 BPM | OXYGEN SATURATION: 99 % | DIASTOLIC BLOOD PRESSURE: 89 MMHG | RESPIRATION RATE: 18 BRPM

## 2024-09-16 VITALS
RESPIRATION RATE: 18 BRPM | OXYGEN SATURATION: 99 % | HEART RATE: 79 BPM | TEMPERATURE: 98.2 F | DIASTOLIC BLOOD PRESSURE: 101 MMHG | SYSTOLIC BLOOD PRESSURE: 152 MMHG

## 2024-09-16 VITALS — WEIGHT: 203 LBS | BODY MASS INDEX: 31.86 KG/M2 | HEIGHT: 67 IN

## 2024-09-16 DIAGNOSIS — Z79.899: ICD-10-CM
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
## 2024-09-16 DIAGNOSIS — Z90.49: ICD-10-CM

## 2024-09-16 DIAGNOSIS — N39.0: ICD-10-CM

## 2024-09-16 DIAGNOSIS — Z98.890: ICD-10-CM

## 2024-09-16 DIAGNOSIS — T78.40XA: Primary | ICD-10-CM

## 2024-09-16 LAB
APPEARANCE UR: CLEAR
BACTERIA URNS QL MICRO: (no result) /HPF
BILIRUB UR QL STRIP: NEGATIVE
COLOR UR: (no result)
GLUCOSE UR STRIP-MCNC: (no result) MG/DL
HGB UR QL STRIP: (no result)
LEUKOCYTE ESTERASE UR QL STRIP: (no result)
NITRITE UR QL STRIP: POSITIVE
PH UR STRIP: 6 [PH] (ref 5–9)
PROT UR QL STRIP: (no result)
RBC #/AREA URNS HPF: (no result) /HPF (ref 0–5)
SP GR UR STRIP: >=1.03 (ref 1–1.03)
SQUAMOUS URNS QL MICRO: (no result) /LPF
UROBILINOGEN UR STRIP-MCNC: 2 EU/DL (ref 0.2–1)
WBC,URINE: (no result) /HPF (ref 0–5)